# Patient Record
Sex: FEMALE | Race: ASIAN | NOT HISPANIC OR LATINO | Employment: FULL TIME | URBAN - METROPOLITAN AREA
[De-identification: names, ages, dates, MRNs, and addresses within clinical notes are randomized per-mention and may not be internally consistent; named-entity substitution may affect disease eponyms.]

---

## 2017-02-28 ENCOUNTER — ALLSCRIPTS OFFICE VISIT (OUTPATIENT)
Dept: OTHER | Facility: OTHER | Age: 42
End: 2017-02-28

## 2017-04-13 ENCOUNTER — GENERIC CONVERSION - ENCOUNTER (OUTPATIENT)
Dept: OTHER | Facility: OTHER | Age: 42
End: 2017-04-13

## 2017-04-14 ENCOUNTER — LAB CONVERSION - ENCOUNTER (OUTPATIENT)
Dept: OTHER | Facility: OTHER | Age: 42
End: 2017-04-14

## 2017-04-14 LAB
A/G RATIO (HISTORICAL): 1.6 (CALC) (ref 1–2.5)
ALBUMIN SERPL BCP-MCNC: 4.7 G/DL (ref 3.6–5.1)
ALP SERPL-CCNC: 36 U/L (ref 33–115)
ALT SERPL W P-5'-P-CCNC: 17 U/L (ref 6–29)
AST SERPL W P-5'-P-CCNC: 19 U/L (ref 10–30)
BACTERIA UR QL AUTO: ABNORMAL /HPF
BASOPHILS # BLD AUTO: 0.8 %
BASOPHILS # BLD AUTO: 62 CELLS/UL (ref 0–200)
BILIRUB SERPL-MCNC: 0.5 MG/DL (ref 0.2–1.2)
BILIRUB UR QL STRIP: NEGATIVE
BUN SERPL-MCNC: 11 MG/DL (ref 7–25)
BUN/CREA RATIO (HISTORICAL): NORMAL (CALC) (ref 6–22)
CALCIUM SERPL-MCNC: 9.4 MG/DL (ref 8.6–10.2)
CHLORIDE SERPL-SCNC: 100 MMOL/L (ref 98–110)
CHOLEST SERPL-MCNC: 198 MG/DL (ref 125–200)
CHOLEST/HDLC SERPL: 3 (CALC)
CO2 SERPL-SCNC: 21 MMOL/L (ref 20–31)
COLOR UR: YELLOW
COMMENT (HISTORICAL): CLEAR
CREAT SERPL-MCNC: 0.71 MG/DL (ref 0.5–1.1)
DEPRECATED RDW RBC AUTO: 13 % (ref 11–15)
EGFR AFRICAN AMERICAN (HISTORICAL): 123 ML/MIN/1.73M2
EGFR-AMERICAN CALC (HISTORICAL): 107 ML/MIN/1.73M2
EOSINOPHIL # BLD AUTO: 0.8 %
EOSINOPHIL # BLD AUTO: 62 CELLS/UL (ref 15–500)
FECAL OCCULT BLOOD DIAGNOSTIC (HISTORICAL): NEGATIVE
GAMMA GLOBULIN (HISTORICAL): 2.9 G/DL (CALC) (ref 1.9–3.7)
GLUCOSE (HISTORICAL): 79 MG/DL (ref 65–99)
GLUCOSE (HISTORICAL): NEGATIVE
HCT VFR BLD AUTO: 43.7 % (ref 35–45)
HDLC SERPL-MCNC: 67 MG/DL
HEPATITIS B SURFACE ANTIBODY (HISTORICAL): <5 MIU/ML
HGB BLD-MCNC: 14.6 G/DL (ref 11.7–15.5)
HYALINE CASTS #/AREA URNS LPF: ABNORMAL /LPF
KETONES UR STRIP-MCNC: ABNORMAL MG/DL
LDL CHOLESTEROL (HISTORICAL): 122 MG/DL (CALC)
LEUKOCYTE ESTERASE UR QL STRIP: ABNORMAL
LYMPHOCYTES # BLD AUTO: 1679 CELLS/UL (ref 850–3900)
LYMPHOCYTES # BLD AUTO: 21.8 %
MCH RBC QN AUTO: 29.8 PG (ref 27–33)
MCHC RBC AUTO-ENTMCNC: 33.5 G/DL (ref 32–36)
MCV RBC AUTO: 89 FL (ref 80–100)
MONOCYTES # BLD AUTO: 316 CELLS/UL (ref 200–950)
MONOCYTES (HISTORICAL): 4.1 %
MUMPS IGG ANTIBODY (HISTORICAL): 3.45 INDEX
NEUTROPHILS # BLD AUTO: 5583 CELLS/UL (ref 1500–7800)
NEUTROPHILS # BLD AUTO: 72.5 %
NITRITE UR QL STRIP: NEGATIVE
NON-HDL-CHOL (CHOL-HDL) (HISTORICAL): 131 MG/DL (CALC)
PH UR STRIP.AUTO: 7 [PH] (ref 5–8)
PLATELET # BLD AUTO: 324 THOUSAND/UL (ref 140–400)
PMV BLD AUTO: 7.9 FL (ref 7.5–12.5)
POTASSIUM SERPL-SCNC: 4.1 MMOL/L (ref 3.5–5.3)
PROT UR STRIP-MCNC: NEGATIVE MG/DL
RBC # BLD AUTO: 4.92 MILLION/UL (ref 3.8–5.1)
RBC (HISTORICAL): ABNORMAL /HPF
RUBELLA, IGG (HISTORICAL): 24.5 INDEX
RUBEOLA AB, IGG (HISTORICAL): 1.89 INDEX
SODIUM SERPL-SCNC: 135 MMOL/L (ref 135–146)
SP GR UR STRIP.AUTO: 1 (ref 1–1.03)
SQUAMOUS EPITHELIAL CELLS (HISTORICAL): ABNORMAL /HPF
TOTAL PROTEIN (HISTORICAL): 7.6 G/DL (ref 6.1–8.1)
TRIGL SERPL-MCNC: 45 MG/DL
TSH SERPL DL<=0.05 MIU/L-ACNC: 1.56 MIU/L
VARICELLA ZOSTER IGG (HISTORICAL): <135 INDEX
WBC # BLD AUTO: 7.7 THOUSAND/UL (ref 3.8–10.8)
WBC # BLD AUTO: ABNORMAL /HPF

## 2017-04-15 ENCOUNTER — ALLSCRIPTS OFFICE VISIT (OUTPATIENT)
Dept: OTHER | Facility: OTHER | Age: 42
End: 2017-04-15

## 2017-05-30 ENCOUNTER — GENERIC CONVERSION - ENCOUNTER (OUTPATIENT)
Dept: OTHER | Facility: OTHER | Age: 42
End: 2017-05-30

## 2017-06-29 ENCOUNTER — ALLSCRIPTS OFFICE VISIT (OUTPATIENT)
Dept: OTHER | Facility: OTHER | Age: 42
End: 2017-06-29

## 2017-07-05 ENCOUNTER — LAB CONVERSION - ENCOUNTER (OUTPATIENT)
Dept: OTHER | Facility: OTHER | Age: 42
End: 2017-07-05

## 2017-07-05 LAB
MITOGEN-NIL (HISTORICAL): 8.8 IU/ML
QAUNTIFERON NIL VALUE (HISTORICAL): 0.06 IU/ML
QFT TB AG MINUS NIL VALUE (HISTORICAL): 0.03 IU/ML
QUANTIFERON TB GOLD (HISTORICAL): NEGATIVE

## 2017-07-10 ENCOUNTER — ALLSCRIPTS OFFICE VISIT (OUTPATIENT)
Dept: OTHER | Facility: OTHER | Age: 42
End: 2017-07-10

## 2017-07-12 ENCOUNTER — GENERIC CONVERSION - ENCOUNTER (OUTPATIENT)
Dept: OTHER | Facility: OTHER | Age: 42
End: 2017-07-12

## 2017-07-19 ENCOUNTER — GENERIC CONVERSION - ENCOUNTER (OUTPATIENT)
Dept: OTHER | Facility: OTHER | Age: 42
End: 2017-07-19

## 2017-08-16 ENCOUNTER — GENERIC CONVERSION - ENCOUNTER (OUTPATIENT)
Dept: OTHER | Facility: OTHER | Age: 42
End: 2017-08-16

## 2017-08-30 ENCOUNTER — ALLSCRIPTS OFFICE VISIT (OUTPATIENT)
Dept: OTHER | Facility: OTHER | Age: 42
End: 2017-08-30

## 2017-09-22 ENCOUNTER — GENERIC CONVERSION - ENCOUNTER (OUTPATIENT)
Dept: OTHER | Facility: OTHER | Age: 42
End: 2017-09-22

## 2017-11-03 ENCOUNTER — GENERIC CONVERSION - ENCOUNTER (OUTPATIENT)
Dept: OTHER | Facility: OTHER | Age: 42
End: 2017-11-03

## 2017-12-11 ENCOUNTER — HOSPITAL ENCOUNTER (OUTPATIENT)
Dept: RADIOLOGY | Age: 42
Discharge: HOME | End: 2017-12-11
Payer: COMMERCIAL

## 2017-12-11 ENCOUNTER — ALLSCRIPTS OFFICE VISIT (OUTPATIENT)
Dept: OTHER | Facility: OTHER | Age: 42
End: 2017-12-11

## 2017-12-11 DIAGNOSIS — Z12.31 ENCOUNTER FOR SCREENING MAMMOGRAM FOR MALIGNANT NEOPLASM OF BREAST: ICD-10-CM

## 2017-12-11 DIAGNOSIS — N92.6 IRREGULAR MENSTRUATION: ICD-10-CM

## 2017-12-11 PROCEDURE — 77063 BREAST TOMOSYNTHESIS BI: CPT

## 2017-12-11 PROCEDURE — 76830 TRANSVAGINAL US NON-OB: CPT

## 2017-12-11 PROCEDURE — 76856 US EXAM PELVIC COMPLETE: CPT

## 2017-12-11 PROCEDURE — G0202 SCR MAMMO BI INCL CAD: HCPCS

## 2017-12-12 ENCOUNTER — GENERIC CONVERSION - ENCOUNTER (OUTPATIENT)
Dept: OTHER | Facility: OTHER | Age: 42
End: 2017-12-12

## 2017-12-12 NOTE — PROGRESS NOTES
Assessment    1  Visit for screening mammogram (V76 12) (Z12 31)   2  Irregular bleeding (626 4) (N92 6)    Plan  Irregular bleeding    · * US PELVIS COMPLETE (TRANSABDOMINAL AND TRANSVAGINAL); Status:Hold For -Scheduling; Requested for:90Xey2785;    Perform:[facility]; Due:86Ttr5358; Ordered;bleeding; Ordered By:Abdi Santa; Visit for screening mammogram    · MAMMO SCREENING BILATERAL W 3D & CAD; Status:Hold For - Scheduling; Requestedfor:24Xmr7265;    Perform:[facility]; Due:76Fjk1541; Ordered;for screening mammogram; Ordered By:Abdi Santa;    Chief Complaint  Chief Complaint Free Text Note Form: pt is here c/o bleeding daily on ParaGard  History of Present Illness  HPI: 38year old female here for evaluation  She was having heavy periods with her ParaGard and in June we started Depo; she received one injection and has had daily vaginal bleeding since that time  She did not come back for another injection for fear of continued abnormal bleeding  She has days where she has only some bleeding when she wipes, and other days she has bleeding heavy enough for a tampon  She has no pain  is concerned about endometrial polyps which her mother had  She is concerned about her history of HPV causing bleeding  discussed her normal TSH in April  We discussed her neg/neg Pap in October 2016  offered her the following:Pap with HPV  I am not sure that this would be covered by her insurance  Pelvic ultrasound  May or may not show polyps whether they are there or not  Endometrial biopsy for tissue diagnosis  this point she would like to check a pelvic ultrasound  We will discuss results once they are back  We discussed her bleeding as still probably due to her Depo, and she may have irregular bleeding for over a year after a Depo  Active Problems    1  Allergic rhinitis (477 9) (J30 9)   2  Encounter for gynecological examination without abnormal finding (V72 31) (Z01 419)   3   Encounter for screening mammogram for malignant neoplasm of breast (V76 12) (Z12 31)   4  Inversion, nipple (611 79) (N64 59)   5  Irregular bleeding (626 4) (N92 6)   6  Need for prophylactic vaccination against hepatitis B virus (V05 3) (Z23)   7  Presence of (intrauterine) contraceptive device (V45 51) (Z97 5)    Past Medical History  1  History of Age At First Period 17 Years Old (Menarche)   2  History of ASCUS with positive high risk HPV cervical (795 01,795 05) (R87 610,R87 810)   3  History Of 2  Previous Pregnancies (V61 5)   4  History of gastritis (V12 79) (Z87 19)   5  History of herpes labialis (V12 09) (Z86 19)   6  History of herpes simplex infection (V12 09) (Z86 19)   7  Normal delivery (650) (O80,Z37 9)   8  Normal delivery 21   9)    Surgical History  1  History of Biopsy Endometrial, Without Cervical Dilation   2  History of Colposcopy Cervix With Biopsy(S) With Endocervical Curettage   3  History of Incision And Drainage Of Pilonidal Cyst, Simple    Family History  Mother    1  Family history of Family Health Status Of Mother - Good  Father    2  Family history of coronary artery disease (V17 3) (Z82 49)   3  Family history of Hypertension (V17 49)  Maternal Grandmother    4  Family history of Diabetes Mellitus (V18 0)   5  Family history of Heart Disease (V17 49)  Maternal Grandfather    6  Family history of Chronic Liver Disease  Family History    7  Family history of Hepatitis   8  Family history of Peptic Ulcer   9  Family history of Urinary Incontinence   10  Family history of Varicose Veins Of Lower Extremities    Social History     · Denied: History of Alcohol Use (History)   · Currently sexually active   · Daily Coffee Consumption (6  Cups/Day)   · Drug Use   ·    · Never a smoker   · Presence of (intrauterine) contraceptive device (V45 51) (Z97 5)   · Uses Safety Equipment - Seatbelts    Current Meds   1  Claritin 10 MG Oral Capsule; Therapy: (Recorded:01May2015) to Recorded   2   ValACYclovir HCl - 1 GM Oral Tablet; TAKE 2 TABLETS AT FIRST SIGN OF ERUPTION,THEN 2 TABLETS EVERY 12 HOURS for a total of 4 doses; Therapy: 21TAU2001 to [phone])  Requested for: 10FJU0861; Last Rx:52Aer8080 Ordered   3  Womens One Daily TABS; Take 1 tablet daily; Therapy: (Recorded:32Mft2060) to Recorded    Allergies  1  Penicillins   2  Unasyn SOLR  3  Seasonal    Vitals  Vital Signs    Recorded: 58JJT9989 30:94AS   Systolic 120, LUE, Sitting   Diastolic 78, LUE, Sitting   Weight 126 lb    BMI Calculated 23 81   BSA Calculated 1 55   LMP Abn bleeding       Health Management  History of Encounter for routine gynecological examination   (1) THIN PREP PAP WITH IMAGING; every 1 year; Last 45ZSI1992; Next Due: 43PMD4771;  Overdue    Signatures   Electronically signed by : Jennifer Tilley, [facility]; Dec 11 2017 10:24AM EST                       (Author)    Electronically signed by : Kymberly Robledo MD; Dec 11 2017  1:07PM EST

## 2018-01-11 NOTE — PROGRESS NOTES
Chief Complaint  Patient is here today to get her depo injection  Given in right buttock area with no difficulty  depo provera 150 mg  2020   Active Problems    1  Allergic rhinitis (477 9) (J30 9)   2  ASCUS with positive high risk HPV cervical (795 01,795 05) (R87 610,R87 810)   3  Encounter for gynecological examination without abnormal finding (V72 31) (Z01 419)   4  Encounter for screening mammogram for malignant neoplasm of breast (V76 12)   (Z12 31)   5  Inversion, nipple (611 79) (N64 59)   6  Menorrhagia (626 2) (N92 0)   7  Need for hepatitis B vaccination (V05 3) (Z23)   8  Presence of (intrauterine) contraceptive device (V45 51) (Z97 5)    Current Meds   1  Claritin 10 MG Oral Capsule; Therapy: (Recorded:39Sgf6778) to Recorded   2  MedroxyPROGESTERone Acetate 150 MG/ML Intramuscular Suspension; INJECT 1 ML   INTRAMUSCULARLY ONCE EVERY 3 MONTHS; Therapy: 13SIP8610 to (Last Teryl Thomas)  Requested for: 38GWG1135 Ordered   3  Womens One Daily TABS; Take 1 tablet daily; Therapy: (Recorded:64Orx3595) to Recorded    Allergies    1  Penicillins   2  Unasyn SOLR    3  Seasonal    Vitals  Signs    Systolic: 396, LUE, Sitting  Diastolic: 66, LUE, Sitting  Height: 5 ft 1 in  Weight: 124 lb   BMI Calculated: 23 43  BSA Calculated: 1 54  LMP: mirena / depo    Plan   MedroxyPROGESTERone Acetate 150 MG/ML Intramuscular Suspension; INJECT INTRAMUSCULARLY EVERY 12 WEEKS AS DIRECTED; Done: 30MVE3066 01:28PM; Status: COMPLETE - Retrospective By Protocol Authorization Ordered  Rx By: Sonja Risk; For: Menorrhagia, Presence of (intrauterine) contraceptive device; Dose of 150 MG;  Intramuscular; ELENA = N; Administered byAbner Wesley MA: 7/10/2017 1:28:00 PM; Last Updated By: Tosha Leiva; 7/10/2017 2:04:40 PM     Future Appointments    Date/Time Provider Specialty Site   10/30/2017 08:00 AM Uzair, Nurse Schedule  [address]   10/02/2017 01:00 PM CHRISTOPHER CURRY, Nurse Schedule  [facility]     Signatures   Electronically signed by : Kalyan Smith, [address]; Jul 10 2017  6:17PM EST                       (Author)

## 2018-01-12 ENCOUNTER — ALLSCRIPTS OFFICE VISIT (OUTPATIENT)
Dept: OTHER | Facility: OTHER | Age: 43
End: 2018-01-12

## 2018-01-12 DIAGNOSIS — R51.9 HEADACHE: ICD-10-CM

## 2018-01-12 DIAGNOSIS — R32 URINARY INCONTINENCE: ICD-10-CM

## 2018-01-12 NOTE — PROGRESS NOTES
Assessment    1  Encounter for preventive health examination (V70 0) (Z00 00)    Plan  Health Maintenance    · Follow-up visit in 1 year Evaluation and Treatment  Follow-up  Status: Hold For -  Scheduling  Requested for: 15Apr2017    Discussion/Summary  health maintenance visit Currently, she eats an adequate diet and has an adequate exercise regimen  cervical cancer screening is current cervical cancer screening is managed by Jazzy Mixon Breast cancer screening: monthly self breast exam was advised and mammogram has been ordered  Colorectal cancer screening: colorectal cancer screening is not indicated  Advice and education were given regarding nutrition, weight bearing exercise, vitamin D supplements, sunscreen use and self skin examination  Patient discussion: discussed with the patient  The treatment plan was reviewed with the patient/guardian  The patient/guardian understands and agrees with the treatment plan      Chief Complaint  Pt presents in the office today for a PE;    Mammo due; order was given last visit  Pap due 10/20/2018         History of Present Illness  HM, Adult Female: The patient is being seen for a health maintenance evaluation  General Health: The patient's health since the last visit is described as good  She has regular dental visits  She denies vision problems  She denies hearing loss  Immunizations status: up to date  Lifestyle:  She consumes a diverse and healthy diet  She exercises regularly  She exercises less than three times a week for 30 or more minutes per session  She does not use tobacco  She consumes alcohol  She reports occasional social alcohol use  She denies drug use  Reproductive health: the patient is premenopausal   she reports normal menses  Screening:      Review of Systems    Constitutional: No fever, no chills, feels well, no tiredness, no recent weight gain or weight loss     Eyes: No complaints of eye pain, no red eyes, no eyesight problems, no discharge, no dry eyes, no itching of eyes  ENT: no earache, no sore throat, no nasal discharge and no hoarseness  Cardiovascular: No complaints of slow heart rate, no fast heart rate, no chest pain, no palpitations, no leg claudication, no lower extremity edema  Respiratory: No complaints of shortness of breath, no wheezing, no cough, no SOB on exertion, no orthopnea, no PND  Gastrointestinal: No complaints of abdominal pain, no constipation, no nausea or vomiting, no diarrhea, no bloody stools  Genitourinary: no dysuria, no pelvic pain, no vaginal discharge, no dysmenorrhea and no unexplained vaginal bleeding  Musculoskeletal: No complaints of arthralgias, no myalgias, no joint swelling or stiffness, no limb pain or swelling  Integumentary: no rashes, no breast pain and no breast lump  Neurological: no headache, no numbness, no tingling, no limb weakness, no fainting and no difficulty walking  Psychiatric: no anxiety, no sleep disturbances, no depression and no emotional problems  Hematologic/Lymphatic: no swollen glands  Active Problems    1  Allergic rhinitis (477 9) (J30 9)   2  ASCUS favor benign (796 9)   3  ASCUS with positive high risk HPV cervical (795 01,795 05) (R87 610,R87 810)   4  Encounter for gynecological examination without abnormal finding (V72 31) (Z01 419)   5  Encounter for screening mammogram for malignant neoplasm of breast (V76 12)   (Z12 31)   6  HPV (human papilloma virus) infection (079 4) (A63 0)   7  Immunity status testing (V72 61) (Z01 84)   8  Inversion, nipple (611 79) (N64 59)   9  Postcoital bleeding (626 7) (N93 0)   10  Presence of (intrauterine) contraceptive device (V45 51) (Z97 5)   11  Screening for cholesterol level (V77 91) (Z13 220)   12  Screening for human papillomavirus (HPV) (V73 81) (Z11 51)   13   Screening for tuberculosis (V74 1) (Z11 1)    Past Medical History    · Acute upper respiratory infection (465 9) (J06 9)   · History of Age At First Period 17 Years Old (Menarche)   · History of Encounter for routine gynecological examination (V72 31) (Z01 419)   · History Of 2  Previous Pregnancies (V61 5)   · History of abdominal pain (V13 89) (J79 805)   · History of gastritis (V12 79) (Z87 19)   · History of herpes simplex infection (V12 09) (Z86 19)   · History of pharyngitis (V12 69) (Z87 09)   · History of upper respiratory infection (V12 09) (Z87 09)   · History of vaginitis (V13 29) (Z87 42)   · History of Hordeolum externum, unspecified laterality (373 11) (H00 019)   · Normal delivery (650) (O80,Z37  9)   · Normal delivery (650) (O80,Z37  9)   · History of Puffy Eyelids (374 82)    Surgical History    · History of Biopsy Endometrial, Without Cervical Dilation   · History of Colposcopy Cervix With Biopsy(S) With Endocervical Curettage   · History of Incision And Drainage Of Pilonidal Cyst, Simple    Family History  Mother    · Family history of Family Health Status Of Mother - Good  Father    · Family history of coronary artery disease (V17 3) (Z82 49)   · Family history of Hypertension (V17 49)  Maternal Grandmother    · Family history of Diabetes Mellitus (V18 0)   · Family history of Heart Disease (V17 49)  Maternal Grandfather    · Family history of Chronic Liver Disease  Family History    · Family history of Hepatitis   · Family history of Peptic Ulcer   · Family history of Urinary Incontinence   · Family history of Varicose Veins Of Lower Extremities    Social History    · Denied: History of Alcohol Use (History)   · Currently sexually active   · Daily Coffee Consumption (6  Cups/Day)   · Drug Use   · Pastusage   ·    · Never a smoker   · Presence of (intrauterine) contraceptive device (V45 51) (Z97 5)   · Uses Safety Equipment - Seatbelts    Current Meds   1  Claritin 10 MG Oral Capsule; Therapy: (Recorded:84Kia7720) to Recorded   2  Womens One Daily TABS; Take 1 tablet daily;    Therapy: (800 0336) to Recorded    Allergies    1  Penicillins   2  Unasyn SOLR    3  Seasonal    Vitals   Recorded: 15Apr2017 09:14AM   Heart Rate 60   Respiration 16   Systolic 576   Diastolic 80   Height 5 ft 1 75 in   Weight 121 lb    BMI Calculated 22 31   BSA Calculated 1 54     Physical Exam    Constitutional   General appearance: No acute distress, well appearing and well nourished  Eyes   Conjunctiva and lids: No swelling, erythema or discharge  PERRL, EOMI  Ears, Nose, Mouth, and Throat   External inspection of ears and nose: Normal     Otoscopic examination: Tympanic membranes translucent with normal light reflex  Canals patent without erythema  Lips, teeth, and gums: Normal, good dentition  Oropharynx: Normal with no erythema, edema, exudate or lesions  Neck   Neck: Supple, symmetric, trachea midline, no masses  Thyroid: Normal, no thyromegaly  Pulmonary   Respiratory effort: No increased work of breathing or signs of respiratory distress  Auscultation of lungs: Clear to auscultation  Cardiovascular   Auscultation of heart: Normal rate and rhythm, normal S1 and S2, no murmurs  Examination of extremities for edema and/or varicosities: Normal     Abdomen   Abdomen: Non-tender, no masses  Liver and spleen: No hepatomegaly or splenomegaly  Lymphatic   Palpation of lymph nodes in neck: No lymphadenopathy  Musculoskeletal   Muscle strength/tone: Normal     Neurologic   Cranial nerves: Cranial nerves II-XII intact  Reflexes: 2+ and symmetric  Psychiatric   Orientation to person, place, and time: Normal     Recent and remote memory: Intact      Mood and affect: Normal        Results/Data  (1) CBC/PLT/DIFF 13Apr2017 12:30PM Justine Lion     Test Name Result Flag Reference   WHITE BLOOD CELL COUNT 7 7 Thousand/uL  3 8-10 8   RED BLOOD CELL COUNT 4 92 Million/uL  3 80-5 10   HEMOGLOBIN 14 6 g/dL  11 7-15 5   HEMATOCRIT 43 7 %  35 0-45 0   MCV 89 0 fL  80 0-100 0   MCH 29 8 pg  27 0-33 0 MCHC 33 5 g/dL  32 0-36 0   RDW 13 0 %  11 0-15 0   PLATELET COUNT 794 Thousand/uL  140-400   MPV 7 9 fL  7 5-12 5   ABSOLUTE NEUTROPHILS 5583 cells/uL  5964-4587   ABSOLUTE LYMPHOCYTES 1679 cells/uL  850-3900   ABSOLUTE MONOCYTES 316 cells/uL  200-950   ABSOLUTE EOSINOPHILS 62 cells/uL     ABSOLUTE BASOPHILS 62 cells/uL  0-200   NEUTROPHILS 72 5 %     LYMPHOCYTES 21 8 %     MONOCYTES 4 1 %     EOSINOPHILS 0 8 %     BASOPHILS 0 8 %       (1) COMPREHENSIVE METABOLIC PANEL 94AHN6068 15:85LC Gabriela Winslow     Test Name Result Flag Reference   GLUCOSE 79 mg/dL  65-99   Fasting reference interval   UREA NITROGEN (BUN) 11 mg/dL  7-25   CREATININE 0 71 mg/dL  0 50-1 10   eGFR NON-AFR   AMERICAN 107 mL/min/1 73m2  > OR = 60   eGFR AFRICAN AMERICAN 123 mL/min/1 73m2  > OR = 60   BUN/CREATININE RATIO   3-36   NOT APPLICABLE (calc)   SODIUM 135 mmol/L  135-146   POTASSIUM 4 1 mmol/L  3 5-5 3   CHLORIDE 100 mmol/L     CARBON DIOXIDE 21 mmol/L  20-31   CALCIUM 9 4 mg/dL  8 6-10 2   PROTEIN, TOTAL 7 6 g/dL  6 1-8 1   ALBUMIN 4 7 g/dL  3 6-5 1   GLOBULIN 2 9 g/dL (calc)  1 9-3 7   ALBUMIN/GLOBULIN RATIO 1 6 (calc)  1 0-2 5   BILIRUBIN, TOTAL 0 5 mg/dL  0 2-1 2   ALKALINE PHOSPHATASE 36 U/L     AST 19 U/L  10-30   ALT 17 U/L  6-29     (1) URINALYSIS (will reflex a microscopy if leukocytes, occult blood, protein or nitrites are not within normal limits) 13Apr2017 12:30PM Gabriela Winslow     Test Name Result Flag Reference   COLOR YELLOW  YELLOW   APPEARANCE CLEAR  CLEAR   SPECIFIC GRAVITY 1 004  1 001-1 035   PH 7 0  5 0-8 0   GLUCOSE NEGATIVE  NEGATIVE   BILIRUBIN NEGATIVE  NEGATIVE   KETONES TRACE A NEGATIVE   OCCULT BLOOD NEGATIVE  NEGATIVE   PROTEIN NEGATIVE  NEGATIVE   NITRITE NEGATIVE  NEGATIVE   LEUKOCYTE ESTERASE TRACE A NEGATIVE   WBC NONE SEEN /HPF  < OR = 5   RBC NONE SEEN /HPF  < OR = 2   SQUAMOUS EPITHELIAL CELLS NONE SEEN /HPF  < OR = 5   BACTERIA NONE SEEN /HPF  NONE SEEN   HYALINE CAST NONE SEEN /LPF  NONE SEEN     (Q) LIPID PANEL WITH REFLEX TO DIRECT LDL 13Apr2017 12:30PM Pushpa Zee     Test Name Result Flag Reference   CHOLESTEROL, TOTAL 198 mg/dL  125-200   HDL CHOLESTEROL 67 mg/dL  > OR = 46   TRIGLICERIDES 45 mg/dL  <593   LDL-CHOLESTEROL 122 mg/dL (calc)  <130   Desirable range <100 mg/dL for patients with CHD or  diabetes and <70 mg/dL for diabetic patients with  known heart disease  CHOL/HDLC RATIO 3 0 (calc)  < OR = 5 0   NON HDL CHOLESTEROL 131 mg/dL (calc)     Target for non-HDL cholesterol is 30 mg/dL higher than   LDL cholesterol target  (Q) TSH, 3RD GENERATION W/REFLEX TO FT4 13Apr2017 12:30PM Pushpa Zee   REPORT COMMENT:  FASTING:YES     Test Name Result Flag Reference   TSH W/REFLEX TO FT4 1 56 mIU/L     Reference Range                         > or = 20 Years  0 40-4 50                              Pregnancy Ranges            First trimester    0 26-2 66            Second trimester   0 55-2 73            Third trimester    0 43-2 91       Health Management  History of Encounter for routine gynecological examination   (1) THIN PREP PAP WITH IMAGING; every 1 year; Last [id number]; Next Due:  50RKY6668;  Overdue    Signatures   Electronically signed by : MILLIE Murphy ; Apr 15 2017 11:01AM EST                       (Author)

## 2018-01-13 VITALS
RESPIRATION RATE: 16 BRPM | HEART RATE: 60 BPM | SYSTOLIC BLOOD PRESSURE: 110 MMHG | DIASTOLIC BLOOD PRESSURE: 80 MMHG | BODY MASS INDEX: 22.26 KG/M2 | WEIGHT: 121 LBS | HEIGHT: 62 IN

## 2018-01-13 VITALS
DIASTOLIC BLOOD PRESSURE: 64 MMHG | SYSTOLIC BLOOD PRESSURE: 100 MMHG | HEIGHT: 61 IN | BODY MASS INDEX: 23.22 KG/M2 | WEIGHT: 123 LBS

## 2018-01-13 VITALS — BODY MASS INDEX: 23.05 KG/M2 | WEIGHT: 122 LBS | SYSTOLIC BLOOD PRESSURE: 100 MMHG | DIASTOLIC BLOOD PRESSURE: 62 MMHG

## 2018-01-13 NOTE — MISCELLANEOUS
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  is it ok to schedule pt for a varicella and a dtap  states she had a titer done for varicella but it showed that she needs to get a shot  i am not sure that is right but that is what pt states  pt # [phone]   Nirukaty Bolivar - 10 Jul 2017 9:48 PM     TASK REPLIED TO: Previously Assigned To Niru Bolivar  she would need to schedule an appointment so we can review the labs she has and give the appropriate shots  If she has a document that says she needs two shots from her work that can be a nursing visit but if they are labs that need to be reviewed and interpreted that should probably be a visit  I can look Wednesday if you want  Lynette Terrazas - 11 Jul 2017 2:36 PM     TASK EDITED  tried to reach pt but her mailbox has not been set up   Lynette Terrazas - 12 Jul 2017 10:05 AM     TASK EDITED  please see the info in red folder on this issue  Niru Bolivar - 12 Jul 2017 12:47 PM     TASK REPLIED TO: Previously Assigned To Niru Katt  see me when you get a chance  Lynette Terrazas - 12 Jul 2017 3:16 PM     TASK EDITED  message was left for pt to call and schedule her varicella shot          Signatures   Electronically signed by : Ariel Bradford, [facility]; Jul 12 2017  4:16PM EST                       (Author)

## 2018-01-13 NOTE — PROGRESS NOTES
Assessment   1  Urinary incontinence (788 30) (R32)   2  Headache (784 0) (R51)    Plan   Headache    · * MRI BRAIN W WO CONTRAST; Status:Need Information - Financial Authorization; Requested ZRO:[id number];   Urinary incontinence    · *1 - [address] or Angelina Martinez for incontinence  Status: Active  Requested for: [id number]  Care Summary provided  : Yes   · 1 Ade Jimenez MD, James Torers (Urology) Co-Management  *  Status: Canceled  Care Summary provided  : Yes    Discussion/Summary      1  headache - most likely due to stress, encouraged starting exercise, sleeping better, healthy eating  will order MRI due to other symptoms that have started when headaches did but most likely are also due to stress  incontinence - see either Christa Gusman or Berny Cherry for pelvic floor PT   - SLOGA due 12/2018   as needed  Possible side effects of new medications were reviewed with the patient/guardian today  The treatment plan was reviewed with the patient/guardian  The patient/guardian understands and agrees with the treatment plan      Chief Complaint   Pt presents here for a headache   due 12/11/2018 due 10/2018      History of Present Illness   HPI: Patient with a headache starting in back of neck and moves to left side of face  Left eye twitching for a couple days  Has been having headaches for months  Taking Claritin today, takes Advil as needed but not daily  Trying to stay hydrated  Has been having headaches for forever  Has lost hearing in R ear, progressive per patient  Twitching under L eye off and on  Even notices sometimes when she talks she will drool a little  All very random and sporadic things but worry patient  PRessure behind eyes  Even noticed increase in incontinence even when laying flat at night  incontinence has gotten worse, requesting a urology referral  Denies burning, hematuria  Has gained weight and has not been working out  very stressful        Active Problems   1  Allergic rhinitis (477 9) (J30 9)   2  Encounter for gynecological examination without abnormal finding (V72 31) (Z01 419)   3  Encounter for screening mammogram for malignant neoplasm of breast (V76 12)     (Z12 31)   4  Inversion, nipple (611 79) (N64 59)   5  Irregular bleeding (626 4) (N92 6)   6  Need for prophylactic vaccination against hepatitis B virus (V05 3) (Z23)   7  Presence of (intrauterine) contraceptive device (V45 51) (Z97 5)   8  Visit for screening mammogram (V76 12) (Z12 31)    Past Medical History   1  History of Age At First Period 17 Years Old (Menarche)   2  History of ASCUS with positive high risk HPV cervical (795 01,795 05)     (R87 610,R87 810)   3  History Of 2  Previous Pregnancies (V61 5)   4  History of gastritis (V12 79) (Z87 19)   5  History of herpes labialis (V12 09) (Z86 19)   6  History of herpes simplex infection (V12 09) (Z86 19)   7  Normal delivery (650) (O80,Z37 9)   8  Normal delivery 21   9)  Active Problems And Past Medical History Reviewed: The active problems and past medical history were reviewed and updated today  Family History   Mother    1  Family history of Family Health Status Of Mother - Good  Father    2  Family history of coronary artery disease (V17 3) (Z82 49)   3  Family history of Hypertension (V17 49)  Maternal Grandmother    4  Family history of Diabetes Mellitus (V18 0)   5  Family history of Heart Disease (V17 49)  Maternal Grandfather    6  Family history of Chronic Liver Disease  Family History    7  Family history of Hepatitis   8  Family history of Peptic Ulcer   9  Family history of Urinary Incontinence   10  Family history of Varicose Veins Of Lower Extremities  Family History Reviewed: The family history was reviewed and updated today         Social History    · Denied: History of Alcohol Use (History)   · Currently sexually active   · Daily Coffee Consumption (6  Cups/Day)   · Drug Use   · Pastusage   ·    · Never a smoker   · Presence of (intrauterine) contraceptive device (V45 51) (Z97 5)   · Uses Safety Equipment - Seatbelts  The social history was reviewed and updated today  The social history was reviewed and is unchanged  Surgical History   1  History of Biopsy Endometrial, Without Cervical Dilation   2  History of Colposcopy Cervix With Biopsy(S) With Endocervical Curettage   3  History of Incision And Drainage Of Pilonidal Cyst, Simple  Surgical History Reviewed: The surgical history was reviewed and updated today  Current Meds    1  Claritin 10 MG Oral Capsule; take 1 capsule daily; Therapy: (Recorded:12Jan2018) to Recorded   2  ValACYclovir HCl - 1 GM Oral Tablet; TAKE 2 TABLETS AT FIRST SIGN OF     ERUPTION,THEN 2 TABLETS EVERY 12 HOURS for a total of 4 doses; Therapy: 60HWY6985 to [phone]  Requested for: 97CVX7733; Last     Rx:10Amv6114 Ordered   3  Womens One Daily TABS; Take 1 tablet daily; Therapy: (Recorded:91Jcy3255) to Recorded     The medication list was reviewed and updated today  Allergies   1  Penicillins   2  Unasyn SOLR  3  Seasonal    Vitals    Recorded: 73CJE0418 01:35PM   Heart Rate 68   Respiration 16   Systolic 566, LUE, Sitting   Diastolic 68, LUE, Sitting   Height 5 ft 1 25 in   Weight 124 lb 9 6 oz   BMI Calculated 23 35   BSA Calculated 1 55     Physical Exam        Constitutional      General appearance: No acute distress, well appearing and well nourished  Eyes      Conjunctiva and lids: No swelling, erythema or discharge  Pupils and irises: Equal, round and reactive to light  Ears, Nose, Mouth, and Throat      External inspection of ears and nose: Normal        Otoscopic examination: Tympanic membranes translucent with normal light reflex  Canals patent without erythema  Nasal mucosa, septum, and turbinates: Normal without edema or erythema         Oropharynx: Normal with no erythema, edema, exudate or lesions  Pulmonary      Respiratory effort: No increased work of breathing or signs of respiratory distress  Auscultation of lungs: Clear to auscultation  Cardiovascular      Palpation of heart: Normal PMI, no thrills  Auscultation of heart: Normal rate and rhythm, normal S1 and S2, without murmurs  Lymphatic      Palpation of lymph nodes in neck: No lymphadenopathy  Musculoskeletal      Gait and station: Normal        Neurologic      Cranial nerves: Cranial nerves 2-12 intact  Reflexes: 2+ and symmetric  Sensation: No sensory loss         Psychiatric      Orientation to person, place, and time: Normal        Mood and affect: Normal           Signatures    Electronically signed by : SHARAN Bojorquez; Jan 12 2018  2:07PM EST                       (Author)     Electronically signed by : Patsy Boeck, M D ; Jan 12 2018  2:26PM EST                       (Author)

## 2018-01-14 VITALS
BODY MASS INDEX: 23.41 KG/M2 | WEIGHT: 124 LBS | SYSTOLIC BLOOD PRESSURE: 114 MMHG | DIASTOLIC BLOOD PRESSURE: 66 MMHG | HEIGHT: 61 IN

## 2018-01-14 VITALS
HEART RATE: 62 BPM | DIASTOLIC BLOOD PRESSURE: 72 MMHG | SYSTOLIC BLOOD PRESSURE: 102 MMHG | WEIGHT: 118.6 LBS | BODY MASS INDEX: 22.39 KG/M2 | HEIGHT: 61 IN | RESPIRATION RATE: 16 BRPM | TEMPERATURE: 98.2 F

## 2018-01-14 NOTE — MISCELLANEOUS
Message   Recorded as Task   Date: 09/15/2017 08:31 AM, Created By: Helen Mcclure   Task Name: Med Renewal Request   Assigned To: Efren Emanuel   Regarding Patient: Vasile Bates, Status: Active   Comment:    Nava Lowry - 15 Sep 2017 8:31 AM     TASK CREATED  PT CALLED FOR A REFILL ON HER VALTREX FOR HER LIP SORE, SHE STATES THAT SHE GOT THIS FROM [facility]  [address]  LeonVamshikelsi - 15 Sep 2017 9:10 AM     TASK REASSIGNED: Previously Assigned To Adali Harris - 15 Sep 2017 9:34 AM     TASK IN PROGRESS   Susan Harper - 15 Sep 2017 9:43 AM     TASK EDITED   for pt tcb re Valtrex request  nothing in the chart   Andra Stringer - 18 Sep 2017 9:11 AM     TASK EDITED   Andra Stringer - 18 Sep 2017 9:11 AM     TASK IN PROGRESS   Andra Stringer - 18 Sep 2017 9:17 AM     TASK EDITED  [facility] for pt to cb       ext: Fredy Galeana - 21 Sep 2017 3:21 PM     TASK EDITED  Dear Rowdy García:    Pt called office today, left message  Pt has history of herpes simplex infection  Pt saw you on 8/30/17 for irregular bleeding  Pt states she felt a lesion beginning to form on her lip, however, was able to stop progression with OTC ABREVA medication  Pt currently has no active breakouts  Pt 's previous breakouts have been limited to her facial area only  Pt has not had any previous  outbreaks in the vaginal area  Pt states she was previously prescribed Valtrex by a provider located in [place], [place]  Pt is requesting another refill of Valtrex medication  Please advise  Thank you and have a great day! Kennedy Boo MA [facility] office)  Cristina Santa - 21 Sep 2017 3:38 PM     TASK REPLIED TO: Previously Assigned To Cristina Santa  does she want this for suppression (500mg po daily) or episodic treatment (500mg po BID x 3 days)? Either is fine  Thanks! Carmen Ladd - 21 Sep 2017 4:08 PM     TASK EDITED  Dear Rowdy García:    I just got off the phone with Pt    Pt is requesting the episodic treatment (Valtrex - 500mg PO BID x 3 days)  Pt further requests that prescription be forwarded to pharmacy [facility] E[facility] [place] [place]  Phone # [phone])  If you have any further questions/concerns, do not hesitate to contact me  Please let me know if you want me to enter Pt's prescription in [employer] for you  Thank you for all of your help! Have a good day! JEFFREY England Wendy - 22 Sep 2017 7:14 AM     TASK REPLIED TO: Previously Assigned To Cristina Santa  I sent rx  I was wrong on dose - for cold sores it is 2g po at onset of symptoms, repeat in 12 hours x 4 doses total   Sent to [facility]  Please make this a nurses note  Thx        Active Problems    1  Allergic rhinitis (477 9) (J30 9)   2  ASCUS with positive high risk HPV cervical (795 01,795 05) (R87 610,R87 810)   3  Cold sore (054 9) (B00 1)   4  Encounter for gynecological examination without abnormal finding (V72 31) (Z01 419)   5  Encounter for screening mammogram for malignant neoplasm of breast (V76 12)   (Z12 31)   6  Inversion, nipple (611 79) (N64 59)   7  Irregular bleeding (626 4) (N92 6)   8  Presence of (intrauterine) contraceptive device (V45 51) (Z97 5)    Current Meds   1  Claritin 10 MG Oral Capsule; Therapy: (Recorded:24Pfr7481) to Recorded   2  MedroxyPROGESTERone Acetate 150 MG/ML Intramuscular Suspension   (Depo-Provera); INJECT 1 ML INTRAMUSCULARLY ONCE EVERY 3   MONTHS; Therapy: 90NDM2113 to (Last [facility])  Requested for: 93MXM1041 Ordered   3  ValACYclovir HCl - 1 GM Oral Tablet (Valtrex); TAKE 2 TABLETS AT FIRST SIGN OF   ERUPTION,THEN 2 TABLETS EVERY 12 HOURS for a total of 4 doses; Therapy: 12VGF2884 to (797 9302)  Requested for: 50WKU2717; Last   Rx:18Asc1410 Ordered   4  Womens One Daily TABS; Take 1 tablet daily; Therapy: (Recorded:42Pmz7820) to Recorded    Allergies    1  Penicillins   2   Unasyn SOLR    3  Seasonal    Signatures   Electronically signed by : Macie Robertson, ; Sep 22 2017  7:29AM EST                       (Author)

## 2018-01-16 NOTE — MISCELLANEOUS
Message  MA spoke with LILIAN Santa today  LILIAN Santa informed MA that she made adjustment with regards to Pt's prescription for Valtrex  New prescription sent to pharmacy in Pt's EHR - verified transmission to retail pharmacy on 9/22/17 at 7:14 am  New prescription ordered for Pt is as follows: Valtrex 1 GM oral tablet, take 2 tablets at first sign of eruption, then 2 tablets every 12 hrs , for a total of 4 doses, 2 days #8 tablets, Refill# 6  Active Problems    1  Allergic rhinitis (477 9) (J30 9)   2  ASCUS with positive high risk HPV cervical (795 01,795 05) (R87 610,R87 810)   3  Cold sore (054 9) (B00 1)   4  Encounter for gynecological examination without abnormal finding (V72 31) (Z01 419)   5  Encounter for screening mammogram for malignant neoplasm of breast (V76 12)   (Z12 31)   6  Inversion, nipple (611 79) (N64 59)   7  Irregular bleeding (626 4) (N92 6)   8  Presence of (intrauterine) contraceptive device (V45 51) (Z97 5)    Current Meds   1  Claritin 10 MG Oral Capsule; Therapy: (Recorded:42Lsh7788) to Recorded   2  MedroxyPROGESTERone Acetate 150 MG/ML Intramuscular Suspension   (Depo-Provera); INJECT 1 ML INTRAMUSCULARLY ONCE EVERY 3   MONTHS; Therapy: 64XOX5289 to (Last Lonne Lion)  Requested for: 88YMQ1200 Ordered   3  ValACYclovir HCl - 1 GM Oral Tablet (Valtrex); TAKE 2 TABLETS AT FIRST SIGN OF   ERUPTION,THEN 2 TABLETS EVERY 12 HOURS for a total of 4 doses; Therapy: 63YIK9287 to ()  Requested for: 65MLQ1438; Last   Rx:96Wyh5249 Ordered   4  Womens One Daily TABS; Take 1 tablet daily; Therapy: (Recorded:51Jft3507) to Recorded    Allergies    1  Penicillins   2   Unasyn SOLR    3  Seasonal    Signatures   Electronically signed by : Jonah Mendoza MA; Sep 22 2017  8:40AM EST                       (Author)

## 2018-01-19 ENCOUNTER — HOSPITAL ENCOUNTER (OUTPATIENT)
Dept: MRI IMAGING | Facility: HOSPITAL | Age: 43
Discharge: HOME | End: 2018-01-19
Payer: COMMERCIAL

## 2018-01-19 DIAGNOSIS — R51.9 HEADACHE: ICD-10-CM

## 2018-01-19 PROCEDURE — A9585 GADOBUTROL INJECTION: HCPCS | Performed by: PHYSICIAN ASSISTANT

## 2018-01-19 PROCEDURE — 70553 MRI BRAIN STEM W/O & W/DYE: CPT

## 2018-01-19 RX ADMIN — GADOBUTROL 5 ML: 604.72 INJECTION INTRAVENOUS at 20:31

## 2018-01-22 VITALS — BODY MASS INDEX: 23.07 KG/M2 | HEIGHT: 61 IN | WEIGHT: 122.2 LBS

## 2018-01-22 VITALS — HEIGHT: 61 IN | BODY MASS INDEX: 23.45 KG/M2 | WEIGHT: 124.2 LBS

## 2018-01-22 VITALS
HEART RATE: 68 BPM | DIASTOLIC BLOOD PRESSURE: 68 MMHG | SYSTOLIC BLOOD PRESSURE: 108 MMHG | HEIGHT: 61 IN | RESPIRATION RATE: 16 BRPM | WEIGHT: 124.6 LBS | BODY MASS INDEX: 23.53 KG/M2

## 2018-01-22 VITALS — WEIGHT: 126 LBS | BODY MASS INDEX: 23.19 KG/M2 | HEIGHT: 62 IN

## 2018-01-22 VITALS — HEIGHT: 61 IN | WEIGHT: 124 LBS | BODY MASS INDEX: 23.41 KG/M2

## 2018-01-23 VITALS — DIASTOLIC BLOOD PRESSURE: 78 MMHG | BODY MASS INDEX: 23.81 KG/M2 | WEIGHT: 126 LBS | SYSTOLIC BLOOD PRESSURE: 110 MMHG

## 2018-01-23 NOTE — MISCELLANEOUS
Message   Recorded as Task   Date: 12/12/2017 01:48 PM, Created By: Catia Lema   Task Name: Go to Result   Assigned To: Kamilah Horner   Regarding Patient: Victoria Mariee, Status: In Progress   Comment:    Cristina Santa - 12 Dec 2017 1:48 PM     TASK CREATED  Please let Armaan Sanchez know that her pelvic ultrasound is completely normal and her IUD looks to be in normal position   Andra Stringer - 12 Dec 2017 1:51 PM     TASK IN PROGRESS   Andra Stringer - 12 Dec 2017 1:53 PM     TASK EDITED  Patient is aware of the u/s and mammo results  Active Problems    1  Allergic rhinitis (477 9) (J30 9)   2  Encounter for gynecological examination without abnormal finding (V72 31) (Z01 419)   3  Encounter for screening mammogram for malignant neoplasm of breast (V76 12)   (Z12 31)   4  Inversion, nipple (611 79) (N64 59)   5  Irregular bleeding (626 4) (N92 6)   6  Need for prophylactic vaccination against hepatitis B virus (V05 3) (Z23)   7  Presence of (intrauterine) contraceptive device (V45 51) (Z97 5)   8  Visit for screening mammogram (V76 12) (Z12 31)    Current Meds   1  Claritin 10 MG Oral Capsule; Therapy: (Recorded:99Den0606) to Recorded   2  ValACYclovir HCl - 1 GM Oral Tablet (Valtrex); TAKE 2 TABLETS AT FIRST SIGN OF   ERUPTION,THEN 2 TABLETS EVERY 12 HOURS for a total of 4 doses; Therapy: 94GZO7249 to (Mervin Jordan)  Requested for: 04YDF3251; Last   Rx:81Crk8388 Ordered   3  Womens One Daily TABS; Take 1 tablet daily; Therapy: (Recorded:60Nbb8611) to Recorded    Allergies    1  Penicillins   2   Unasyn SOLR    3  Seasonal    Signatures   Electronically signed by : Michelle Parker, ; Dec 12 2017  1:53PM EST                       (Author)

## 2018-02-09 ENCOUNTER — OFFICE VISIT (OUTPATIENT)
Dept: FAMILY MEDICINE CLINIC | Facility: CLINIC | Age: 43
End: 2018-02-09
Payer: COMMERCIAL

## 2018-02-09 VITALS
TEMPERATURE: 97.9 F | HEIGHT: 61 IN | WEIGHT: 125 LBS | RESPIRATION RATE: 12 BRPM | SYSTOLIC BLOOD PRESSURE: 104 MMHG | BODY MASS INDEX: 23.6 KG/M2 | DIASTOLIC BLOOD PRESSURE: 64 MMHG | HEART RATE: 82 BPM

## 2018-02-09 DIAGNOSIS — R05.9 COUGH: Primary | ICD-10-CM

## 2018-02-09 DIAGNOSIS — J32.9 OTHER SINUSITIS, UNSPECIFIED CHRONICITY: ICD-10-CM

## 2018-02-09 PROCEDURE — 99213 OFFICE O/P EST LOW 20 MIN: CPT | Performed by: NURSE PRACTITIONER

## 2018-02-09 PROCEDURE — 3008F BODY MASS INDEX DOCD: CPT | Performed by: NURSE PRACTITIONER

## 2018-02-09 RX ORDER — LORATADINE 10 MG/1
1 CAPSULE, LIQUID FILLED ORAL DAILY
COMMUNITY
End: 2018-11-29 | Stop reason: ALTCHOICE

## 2018-02-09 RX ORDER — AZITHROMYCIN 250 MG/1
250 TABLET, FILM COATED ORAL EVERY 24 HOURS
Qty: 6 TABLET | Refills: 0 | Status: SHIPPED | OUTPATIENT
Start: 2018-02-09 | End: 2018-02-14

## 2018-02-09 RX ORDER — PREDNISONE 20 MG/1
20 TABLET ORAL 2 TIMES DAILY WITH MEALS
Qty: 6 TABLET | Refills: 0 | Status: SHIPPED | OUTPATIENT
Start: 2018-02-09 | End: 2018-11-29 | Stop reason: ALTCHOICE

## 2018-02-09 RX ORDER — MULTIVIT WITH CALCIUM,IRON,MIN 27MG-0.4MG
1 TABLET ORAL DAILY
COMMUNITY
End: 2019-09-11 | Stop reason: ALTCHOICE

## 2018-02-09 RX ORDER — VALACYCLOVIR HYDROCHLORIDE 1 G/1
TABLET, FILM COATED ORAL
COMMUNITY
Start: 2017-09-22 | End: 2019-01-30 | Stop reason: SDUPTHER

## 2018-02-09 NOTE — PROGRESS NOTES
Chief Complaint   Patient presents with    Fatigue    Headache    Cough     Assessment/Plan:    1  Cough  We have prescribed short burst prednisone  Increase fluids and take mucinex  Call if you are not improving    - Peak flow; Future  - Peak flow    2  Other sinusitis, unspecified chronicity   steam , zpak and lots of fluids  Plain mucinex  Call if you are not getting better  - predniSONE 20 mg tablet; Take 1 tablet (20 mg total) by mouth 2 (two) times a day with meals  Dispense: 6 tablet; Refill: 0  - azithromycin (ZITHROMAX) 250 mg tablet; Take 1 tablet (250 mg total) by mouth every 24 hours for 5 days Take 2 pills today and one pill a day for the remaining 4 days  Dispense: 6 tablet; Refill: 0        Subjective:      Patient ID: Shimon Stanford is a 41 y o  female  Here today with complaint of cough and congestion ongoing on for the past 1 1/2 weeks  Has PND that is upsetting her stomcah    Has a lot cough and chest congestion  Is doing a lot of traveling  No fevers  Does feel tight and wheezing in her chest     has a heaviness and pressure  in her sinus   has some body aches but not really bad  Has been taking vit c and is also using advil for the headaches  The following portions of the patient's history were reviewed and updated as appropriate: allergies, current medications, past family history, past medical history, past social history, past surgical history and problem list     History reviewed  No pertinent past medical history  History reviewed  No pertinent surgical history  Family History   Problem Relation Age of Onset    No Known Problems Mother     Heart attack Father      Social History   Social History     Social History    Marital status: /Civil Union     Spouse name: N/A    Number of children: N/A    Years of education: N/A     Occupational History    Not on file       Social History Main Topics    Smoking status: Never Smoker    Smokeless tobacco: Never Used    Alcohol use No    Drug use: No    Sexual activity: Not on file     Other Topics Concern    Not on file     Social History Narrative    No narrative on file     Review of Systems   Constitutional: Negative  HENT: Positive for congestion, postnasal drip, sinus pain and sinus pressure  Negative for ear pain and sore throat  Respiratory: Positive for cough and wheezing  Negative for shortness of breath  Cardiovascular: Negative  Musculoskeletal: Negative  Psychiatric/Behavioral: Negative  Vitals:    02/09/18 1103   BP: 104/64   BP Location: Left arm   Patient Position: Sitting   Pulse: 82   Resp: 12   Temp: 97 9 °F (36 6 °C)   TempSrc: Oral   Weight: 56 7 kg (125 lb)   Height: 5' 1" (1 549 m)       Objective:         Physical Exam   Constitutional: She appears well-developed and well-nourished  HENT:   Head: Normocephalic and atraumatic  Right Ear: External ear normal    Left Ear: External ear normal    Nose: Nose normal    Mouth/Throat: Oropharynx is clear and moist    Throat with purulent PND  turbs inflamed with purulent discharge   Neck: Neck supple  Cardiovascular: Normal rate, regular rhythm and normal heart sounds  Pulmonary/Chest: Effort normal and breath sounds normal  No respiratory distress  She has no wheezes  Skin: Skin is warm and dry  Psychiatric: She has a normal mood and affect   Her behavior is normal  Judgment and thought content normal

## 2018-04-19 ENCOUNTER — TELEPHONE (OUTPATIENT)
Dept: FAMILY MEDICINE CLINIC | Facility: CLINIC | Age: 43
End: 2018-04-19

## 2018-04-19 NOTE — TELEPHONE ENCOUNTER
Patient was asking if she could have a note for work to get a new mobile desk  Patient states it would be because of back pain

## 2018-04-19 NOTE — TELEPHONE ENCOUNTER
Call pt: I have never treated her or seen her for back pain  she does usually see other providers in this office  Ask her if one of them have treated her for back pain and if so forward the note to them    If not,she will need an ov to discuss

## 2018-10-10 ENCOUNTER — TELEPHONE (OUTPATIENT)
Dept: FAMILY MEDICINE CLINIC | Facility: CLINIC | Age: 43
End: 2018-10-10

## 2018-11-29 ENCOUNTER — OFFICE VISIT (OUTPATIENT)
Dept: FAMILY MEDICINE CLINIC | Facility: CLINIC | Age: 43
End: 2018-11-29
Payer: COMMERCIAL

## 2018-11-29 VITALS
HEIGHT: 61 IN | BODY MASS INDEX: 21.71 KG/M2 | DIASTOLIC BLOOD PRESSURE: 62 MMHG | SYSTOLIC BLOOD PRESSURE: 100 MMHG | RESPIRATION RATE: 12 BRPM | HEART RATE: 70 BPM | WEIGHT: 115 LBS

## 2018-11-29 DIAGNOSIS — Z12.39 SCREENING FOR MALIGNANT NEOPLASM OF BREAST: Primary | ICD-10-CM

## 2018-11-29 DIAGNOSIS — J02.9 SORE THROAT: ICD-10-CM

## 2018-11-29 LAB — S PYO AG THROAT QL: NEGATIVE

## 2018-11-29 PROCEDURE — 1036F TOBACCO NON-USER: CPT | Performed by: NURSE PRACTITIONER

## 2018-11-29 PROCEDURE — 99213 OFFICE O/P EST LOW 20 MIN: CPT | Performed by: NURSE PRACTITIONER

## 2018-11-29 PROCEDURE — 3008F BODY MASS INDEX DOCD: CPT | Performed by: NURSE PRACTITIONER

## 2018-11-29 PROCEDURE — 87880 STREP A ASSAY W/OPTIC: CPT | Performed by: NURSE PRACTITIONER

## 2018-11-29 NOTE — PROGRESS NOTES
Assessment/Plan:    1  Screening for malignant neoplasm of breast  Script given   - Mammo screening bilateral w cad; Future    2  Sore throat  The rapid strep was neg / please use warm salt water gargles and rest  Call if you are not feeling better and we will call if the throat culture is positive  - POCT rapid strepA  - Throat culture; Future  - Throat culture    rto prn       Subjective:      Patient ID: Amrit Cormier is a 41 y o  female  Here today with complaint of a sore throat  Started 4 days ago with headache and stomach ache  Took advil and things seemed better  Yesterday started with the sore throat  Is doing saline gargels  No cough or chest congestion  No fevers           OBJECTIVE  Past Medical History:   Diagnosis Date    ASCUS with positive high risk HPV cervical     resolved 12/11/17      [facility]    Wt Readings from Last 3 Encounters:   11/29/18 52 2 kg (115 lb)   02/09/18 56 7 kg (125 lb)   01/19/18 56 7 kg (125 lb)     BP Readings from Last 3 Encounters:   11/29/18 100/62   02/09/18 104/64   01/12/18 108/68     Pulse Readings from Last 3 Encounters:   11/29/18 70   02/09/18 82   01/12/18 68     BMI Readings from Last 3 Encounters:   11/29/18 21 73 kg/m²   02/09/18 23 62 kg/m²   01/19/18 23 43 kg/m²        Physical Exam   Constitutional: She is oriented to person, place, and time  She appears well-developed and well-nourished  HENT:   Tm's clear  turbs opren no discharge  Pharynx benign   Neck: Normal range of motion  Neck supple  No thyromegaly present  Cardiovascular: Normal rate, regular rhythm, S1 normal and normal heart sounds  Exam reveals no distant heart sounds  Pulmonary/Chest: Effort normal and breath sounds normal  No respiratory distress  She has no wheezes  Musculoskeletal: Normal range of motion  Neurological: She is alert and oriented to person, place, and time  Skin: Skin is warm and dry  Psychiatric: She has a normal mood and affect   Her behavior is normal  Judgment and thought content normal

## 2019-01-30 DIAGNOSIS — B00.2 ORAL HERPES: Primary | ICD-10-CM

## 2019-01-30 RX ORDER — VALACYCLOVIR HYDROCHLORIDE 1 G/1
TABLET, FILM COATED ORAL
Qty: 8 TABLET | Refills: 6 | Status: SHIPPED | OUTPATIENT
Start: 2019-01-30 | End: 2021-06-28 | Stop reason: SDUPTHER

## 2019-02-08 ENCOUNTER — ANNUAL EXAM (OUTPATIENT)
Dept: OBGYN CLINIC | Facility: CLINIC | Age: 44
End: 2019-02-08
Payer: COMMERCIAL

## 2019-02-08 VITALS
HEIGHT: 61 IN | SYSTOLIC BLOOD PRESSURE: 102 MMHG | BODY MASS INDEX: 22.28 KG/M2 | DIASTOLIC BLOOD PRESSURE: 64 MMHG | WEIGHT: 118 LBS

## 2019-02-08 DIAGNOSIS — Z01.419 ENCNTR FOR GYN EXAM (GENERAL) (ROUTINE) W/O ABN FINDINGS: ICD-10-CM

## 2019-02-08 DIAGNOSIS — Z12.31 ENCOUNTER FOR SCREENING MAMMOGRAM FOR MALIGNANT NEOPLASM OF BREAST: ICD-10-CM

## 2019-02-08 PROCEDURE — 99396 PREV VISIT EST AGE 40-64: CPT | Performed by: PHYSICIAN ASSISTANT

## 2019-02-08 PROCEDURE — G0145 SCR C/V CYTO,THINLAYER,RESCR: HCPCS | Performed by: PHYSICIAN ASSISTANT

## 2019-02-08 PROCEDURE — 87624 HPV HI-RISK TYP POOLED RSLT: CPT | Performed by: PHYSICIAN ASSISTANT

## 2019-02-08 NOTE — PROGRESS NOTES
Alannah Bah  1975      CC:  Yearly exam    S:  45 y o  female here for yearly exam  Her cycles are regular, not heavy or crampy  She now gets some spotting 3-4 days prior to her period along with hot flashes  She is sexually active  She uses [employer] (12/2010) for contraception  She is considering a change to [place] next year, or earlier if she desires  Pap  5/1/15 ASCUS +HPV  Colpo benign  Ppa 10/20/16 neg/neg  Last Mammo 12/11/17 neg    Current Outpatient Prescriptions:     Multiple Vitamins-Minerals (WOMENS ONE DAILY) TABS, Take 1 tablet by mouth daily, Disp: , Rfl:     valACYclovir (VALTREX) 1,000 mg tablet, TAKE 2 TABLETS AT FIRST SIGN OF ERUPTION,THEN 2 TABLETS EVERY 12 HOURS for a total of 4 doses), Disp: 8 tablet, Rfl: 6  Social History     Social History    Marital status: /Civil Union     Spouse name: N/A    Number of children: N/A    Years of education: N/A     Occupational History    Not on file       Social History Main Topics    Smoking status: Never Smoker    Smokeless tobacco: Never Used    Alcohol use Yes      Comment: rarely    Drug use: No      Comment: drug use active past usage per [employer]scipt     Sexual activity: Yes     Birth control/ protection: IUD      Comment: presence of  intrauterine contraceptive device                          Other Topics Concern    Not on file     Social History Narrative    Daily coffee consumption 6 cups day    Uses safety equipment seatbelts     Family History   Problem Relation Age of Onset    No Known Problems Mother     Heart attack Father     Coronary artery disease Father     Hypertension Father     Diabetes Maternal Grandmother     Heart disease Maternal Grandmother     Liver disease Maternal Grandfather         chronic     Hepatitis Family     Ulcers Family     Other Family         urinary incontinence     Varicose Veins Family         of lower extremities     Substance Abuse Neg Hx     Mental illness Neg Hx Past Medical History:   Diagnosis Date    ASCUS with positive high risk HPV cervical     resolved 12/11/17     Chlamydia     HPV (human papilloma virus) infection     Migraine     Urinary tract infection         O:  Blood pressure 102/64, height 5' 1 02" (1 55 m), weight 53 5 kg (118 lb), last menstrual period 01/31/2019  Patient appears well and is not in distress  Neck is supple without masses  Breasts are symmetrical without mass, tenderness, nipple discharge, skin changes or adenopathy  Abdomen is soft and nontender without masses  External genitals are normal without lesions or rashes  Vagina is normal without discharge or bleeding  Cervix is normal without discharge or lesion  IUD strings are normal    Uterus is normal, mobile, nontender without palpable mass  Adnexa are normal, nontender, without palpable mass  A:  Yearly exam      P:   Pap and HPV today   Mammo slip provided    RTO one year for yearly exam or sooner as needed

## 2019-02-13 LAB
HPV HR 12 DNA CVX QL NAA+PROBE: NEGATIVE
HPV16 DNA CVX QL NAA+PROBE: NEGATIVE
HPV18 DNA CVX QL NAA+PROBE: NEGATIVE

## 2019-02-14 LAB
LAB AP GYN PRIMARY INTERPRETATION: NORMAL
Lab: NORMAL

## 2019-04-10 ENCOUNTER — APPOINTMENT (OUTPATIENT)
Dept: RADIOLOGY | Facility: CLINIC | Age: 44
End: 2019-04-10
Payer: COMMERCIAL

## 2019-04-10 ENCOUNTER — OFFICE VISIT (OUTPATIENT)
Dept: OBGYN CLINIC | Facility: CLINIC | Age: 44
End: 2019-04-10
Payer: COMMERCIAL

## 2019-04-10 VITALS
DIASTOLIC BLOOD PRESSURE: 74 MMHG | HEART RATE: 78 BPM | SYSTOLIC BLOOD PRESSURE: 111 MMHG | BODY MASS INDEX: 21.53 KG/M2 | HEIGHT: 62 IN | WEIGHT: 117 LBS

## 2019-04-10 DIAGNOSIS — M54.16 LUMBAR RADICULOPATHY: ICD-10-CM

## 2019-04-10 DIAGNOSIS — M46.1 SACROILIITIS (HCC): Primary | ICD-10-CM

## 2019-04-10 DIAGNOSIS — M54.50 ACUTE RIGHT-SIDED LOW BACK PAIN WITHOUT SCIATICA: ICD-10-CM

## 2019-04-10 PROCEDURE — 99204 OFFICE O/P NEW MOD 45 MIN: CPT | Performed by: ORTHOPAEDIC SURGERY

## 2019-04-10 PROCEDURE — 72110 X-RAY EXAM L-2 SPINE 4/>VWS: CPT

## 2019-04-24 ENCOUNTER — TRANSCRIBE ORDERS (OUTPATIENT)
Dept: RADIOLOGY | Facility: HOSPITAL | Age: 44
End: 2019-04-24

## 2019-04-24 ENCOUNTER — HOSPITAL ENCOUNTER (OUTPATIENT)
Dept: RADIOLOGY | Facility: HOSPITAL | Age: 44
Discharge: HOME | End: 2019-04-24
Attending: ORTHOPAEDIC SURGERY
Payer: COMMERCIAL

## 2019-04-24 DIAGNOSIS — M46.1 SACROILIITIS (HCC): ICD-10-CM

## 2019-04-24 PROCEDURE — G0260 INJ FOR SACROILIAC JT ANESTH: HCPCS

## 2019-04-24 RX ORDER — METHYLPREDNISOLONE ACETATE 80 MG/ML
80 INJECTION, SUSPENSION INTRA-ARTICULAR; INTRALESIONAL; INTRAMUSCULAR; SOFT TISSUE ONCE
Status: COMPLETED | OUTPATIENT
Start: 2019-04-24 | End: 2019-04-24

## 2019-04-24 RX ORDER — BUPIVACAINE HYDROCHLORIDE 2.5 MG/ML
2 INJECTION, SOLUTION EPIDURAL; INFILTRATION; INTRACAUDAL ONCE
Status: COMPLETED | OUTPATIENT
Start: 2019-04-24 | End: 2019-04-24

## 2019-04-24 RX ADMIN — METHYLPREDNISOLONE ACETATE 80 MG: 80 INJECTION, SUSPENSION INTRA-ARTICULAR; INTRALESIONAL; INTRAMUSCULAR; SOFT TISSUE at 10:01

## 2019-04-24 RX ADMIN — BUPIVACAINE HYDROCHLORIDE 2 ML: 2.5 INJECTION, SOLUTION EPIDURAL; INFILTRATION; INTRACAUDAL; PERINEURAL at 10:01

## 2019-05-02 ENCOUNTER — HOSPITAL ENCOUNTER (OUTPATIENT)
Dept: MRI IMAGING | Facility: HOSPITAL | Age: 44
Discharge: HOME | End: 2019-05-02
Attending: ORTHOPAEDIC SURGERY
Payer: COMMERCIAL

## 2019-05-02 DIAGNOSIS — M54.50 ACUTE RIGHT-SIDED LOW BACK PAIN WITHOUT SCIATICA: ICD-10-CM

## 2019-05-02 DIAGNOSIS — M46.1 SACROILIITIS (HCC): ICD-10-CM

## 2019-05-02 DIAGNOSIS — M54.16 LUMBAR RADICULOPATHY: ICD-10-CM

## 2019-05-02 PROCEDURE — 72148 MRI LUMBAR SPINE W/O DYE: CPT

## 2019-05-06 ENCOUNTER — OFFICE VISIT (OUTPATIENT)
Dept: OBGYN CLINIC | Facility: HOSPITAL | Age: 44
End: 2019-05-06
Payer: COMMERCIAL

## 2019-05-06 VITALS
HEART RATE: 79 BPM | DIASTOLIC BLOOD PRESSURE: 73 MMHG | HEIGHT: 62 IN | BODY MASS INDEX: 21.53 KG/M2 | WEIGHT: 117 LBS | SYSTOLIC BLOOD PRESSURE: 112 MMHG

## 2019-05-06 DIAGNOSIS — M46.1 SACROILIITIS (HCC): Primary | ICD-10-CM

## 2019-05-06 PROCEDURE — 99213 OFFICE O/P EST LOW 20 MIN: CPT | Performed by: ORTHOPAEDIC SURGERY

## 2019-07-24 ENCOUNTER — OFFICE VISIT (OUTPATIENT)
Dept: FAMILY MEDICINE CLINIC | Facility: CLINIC | Age: 44
End: 2019-07-24
Payer: COMMERCIAL

## 2019-07-24 VITALS
DIASTOLIC BLOOD PRESSURE: 78 MMHG | RESPIRATION RATE: 15 BRPM | HEART RATE: 70 BPM | BODY MASS INDEX: 23.28 KG/M2 | HEIGHT: 61 IN | SYSTOLIC BLOOD PRESSURE: 114 MMHG | WEIGHT: 123.3 LBS

## 2019-07-24 DIAGNOSIS — Z00.00 ANNUAL PHYSICAL EXAM: Primary | ICD-10-CM

## 2019-07-24 PROCEDURE — 99396 PREV VISIT EST AGE 40-64: CPT | Performed by: PHYSICIAN ASSISTANT

## 2019-07-24 NOTE — PROGRESS NOTES
ADULT ANNUAL PHYSICAL  [facility] - [address] [facility]    NAME: Paula Reason  AGE: 45 y o  SEX: female  : 1975     DATE: 2019     Assessment and Plan:         Immunizations and preventive care screenings were discussed with patient today  Appropriate education was printed on patient's after visit summary  Counseling:  Alcohol/drug use: discussed moderation in alcohol intake and avoidance of illicit drug use  Dental Health: discussed importance of regular tooth brushing, flossing, and dental visits  · Injury prevention: discussed safety/seat belts, safety helmets, smoke detectors, carbon dioxide detectors, and smoking near bedding or upholstery  · Mammo - due now  · Sacroiliitis - refer to Jalen Alvarez for PT eval, rx given for ergonomic desk    Return in 1 year (on 2020)  Chief Complaint:     Chief Complaint   Patient presents with    Physical Exam      History of Present Illness:     Adult Annual Physical   Patient here for a comprehensive physical exam  The patient reports problems of Sacroiliitis, following with ortho, had injections still just a dull limiting ache all the time  Diet and Physical Activity  · Diet/Nutrition: well balanced diet  · Exercise: moderate cardiovascular exercise and 3-4 times a week on average  Depression Screening  PHQ-9 Depression Screening    PHQ-9:    Frequency of the following problems over the past two weeks:       Little interest or pleasure in doing things:  0 - not at all  Feeling down, depressed, or hopeless:  0 - not at all  PHQ-2 Score:  0       General Health  · Sleep: gets 7-8 hours of sleep on average  · Hearing: normal - bilateral   · Vision: goes for regular eye exams, most recent eye exam <1 year ago and wears glasses  · Dental: regular dental visits and brushes teeth twice daily         /GYN Health  · Patient is: perimenopausal  · PAP normal 2019  · Mammo due  · Review of Systems:     Review of Systems   Constitutional: Negative  HENT: Negative  Eyes: Negative  Respiratory: Negative  Cardiovascular: Negative  Gastrointestinal: Negative  Endocrine: Negative  Genitourinary: Negative  Musculoskeletal: Negative  Skin: Negative  Allergic/Immunologic: Negative  Neurological: Negative  Hematological: Negative  Psychiatric/Behavioral: Negative         Past Medical History:     Past Medical History:   Diagnosis Date    ASCUS with positive high risk HPV cervical     resolved 12/11/17     Chlamydia     HPV (human papilloma virus) infection     Migraine     Urinary tract infection       Past Surgical History:     Past Surgical History:   Procedure Laterality Date    CERVICAL BIOPSY  W/ LOOP ELECTRODE EXCISION      COLPOSCOPY W/ BIOPSY / CURETTAGE      coloposcopy cervix with biopsy with endocervical curettage 6/1/15 colpo chronic cervicitis     ENDOMETRIAL BIOPSY      without cervical dilation Eloisa Cheung 2010    PILONIDAL CYST DRAINAGE  2006    incision and drainage of pilonidal cyst simple       Social History:     Social History     Socioeconomic History    Marital status: /Civil Union     Spouse name: None    Number of children: None    Years of education: None    Highest education level: None   Occupational History    None   Social Needs    Financial resource strain: None    Food insecurity:     Worry: None     Inability: None    Transportation needs:     Medical: None     Non-medical: None   Tobacco Use    Smoking status: Never Smoker    Smokeless tobacco: Never Used   Substance and Sexual Activity    Alcohol use: Yes     Comment: rarely    Drug use: No     Comment: drug use active past usage per allscipt     Sexual activity: Yes     Birth control/protection: IUD     Comment: presence of  intrauterine contraceptive device                        Lifestyle    Physical activity:     Days per week: None     Minutes per session: None    Stress: None   Relationships    Social connections:     Talks on phone: None     Gets together: None     Attends Protestant service: None     Active member of club or organization: None     Attends meetings of clubs or organizations: None     Relationship status: None    Intimate partner violence:     Fear of current or ex partner: None     Emotionally abused: None     Physically abused: None     Forced sexual activity: None   Other Topics Concern    None   Social History Narrative    Daily coffee consumption 6 cups day    Uses safety equipment seatbelts      Family History:     Family History   Problem Relation Age of Onset    No Known Problems Mother     Heart attack Father     Coronary artery disease Father     Hypertension Father     Diabetes Maternal Grandmother     Heart disease Maternal Grandmother     Liver disease Maternal Grandfather         chronic     Hepatitis Family     Ulcers Family     Other Family         urinary incontinence     Varicose Veins Family         of lower extremities     Substance Abuse Neg Hx     Mental illness Neg Hx       Current Medications:     Current Outpatient Medications   Medication Sig Dispense Refill    Multiple Vitamins-Minerals (WOMENS ONE DAILY) TABS Take 1 tablet by mouth daily      valACYclovir (VALTREX) 1,000 mg tablet TAKE 2 TABLETS AT FIRST SIGN OF ERUPTION,THEN 2 TABLETS EVERY 12 HOURS for a total of 4 doses) 8 tablet 6     No current facility-administered medications for this visit  Allergies: Allergies   Allergen Reactions    Ampicillin-Sulbactam Sodium Rash     Category: Allergy;     Penicillins Rash     Category:  Allergy;     Pollen Extract       Physical Exam:     /78 (BP Location: Left arm, Patient Position: Sitting, Cuff Size: Standard)   Pulse 70   Resp 15   Ht 5' 1" (1 549 m)   Wt 55 9 kg (123 lb 4 8 oz)   BMI 23 30 kg/m²     Physical Exam   Constitutional: She is oriented to person, place, and time  She appears well-developed and well-nourished  HENT:   Head: Normocephalic and atraumatic  Right Ear: External ear normal    Left Ear: External ear normal    Nose: Nose normal    Mouth/Throat: Oropharynx is clear and moist    Eyes: Pupils are equal, round, and reactive to light  Conjunctivae and EOM are normal    Neck: Normal range of motion  Neck supple  No thyromegaly present  Cardiovascular: Normal rate, regular rhythm, normal heart sounds and intact distal pulses  No murmur heard  Pulmonary/Chest: Effort normal and breath sounds normal  No respiratory distress  She has no wheezes  She has no rales  Abdominal: Soft  Bowel sounds are normal  She exhibits no distension and no mass  There is no tenderness  Musculoskeletal: Normal range of motion  She exhibits no edema  Lymphadenopathy:     She has no cervical adenopathy  Neurological: She is alert and oriented to person, place, and time  She has normal reflexes  No cranial nerve deficit  Skin: Skin is warm and dry  Psychiatric: She has a normal mood and affect   Judgment normal        Han Richard PA-C  [address]

## 2019-07-24 NOTE — PATIENT INSTRUCTIONS

## 2019-09-11 ENCOUNTER — OFFICE VISIT (OUTPATIENT)
Dept: FAMILY MEDICINE CLINIC | Facility: CLINIC | Age: 44
End: 2019-09-11
Payer: COMMERCIAL

## 2019-09-11 VITALS
BODY MASS INDEX: 23.41 KG/M2 | SYSTOLIC BLOOD PRESSURE: 100 MMHG | TEMPERATURE: 98.1 F | RESPIRATION RATE: 14 BRPM | HEIGHT: 61 IN | DIASTOLIC BLOOD PRESSURE: 70 MMHG | WEIGHT: 124 LBS | HEART RATE: 74 BPM

## 2019-09-11 DIAGNOSIS — L72.0 EPIDERMAL INCLUSION CYST: ICD-10-CM

## 2019-09-11 DIAGNOSIS — L08.9 INFECTED CYST OF SKIN: Primary | ICD-10-CM

## 2019-09-11 DIAGNOSIS — L72.9 INFECTED CYST OF SKIN: Primary | ICD-10-CM

## 2019-09-11 PROCEDURE — 99213 OFFICE O/P EST LOW 20 MIN: CPT | Performed by: FAMILY MEDICINE

## 2019-09-11 RX ORDER — SULFAMETHOXAZOLE AND TRIMETHOPRIM 800; 160 MG/1; MG/1
1 TABLET ORAL 2 TIMES DAILY WITH MEALS
Qty: 20 TABLET | Refills: 0 | Status: SHIPPED | OUTPATIENT
Start: 2019-09-11 | End: 2019-09-21

## 2019-09-11 NOTE — PROGRESS NOTES
Assessment/Plan:    1  Infected cyst of skin  - keep the sites clean, wash twice daily with soap and water, apply warm compresses to right groin area, start Bactrim, wound culture obtained and we will call with results, follow up if symptoms not better, may need I&D  - sulfamethoxazole-trimethoprim (BACTRIM DS) 800-160 mg per tablet; Take 1 tablet by mouth 2 (two) times a day with meals for 10 days  Dispense: 20 tablet; Refill: 0  - Culture, Aerobic Bacteria       Possible side effects of new medications were reviewed with the patient today  The treatment plan was reviewed with the patient  The patient understands and agrees with the treatment plan        Subjective:   Chief Complaint   Patient presents with    Wound Infection     pubic area      Patient ID: Taya Krueger is a 45 y o  female who presents today with complaining of painful lump on her suprapubic area onset 3 days ago, she was using warm compresses and yesterday the area opened and started draining  Patient states that pain and swelling now improved and today she has slight drainage, but less than yesterday  Patient also now noticed a tender lump in her right groin, no drainage, no fever or chills  Patient is going on business trip tonight and will be returning Monday         The following portions of the patient's history were reviewed and updated as appropriate: allergies, current medications, past family history, past medical history, past social history, past surgical history and problem list     Past Medical History:   Diagnosis Date    ASCUS with positive high risk HPV cervical     resolved 12/11/17     Chlamydia     HPV (human papilloma virus) infection     Migraine     Urinary tract infection      Past Surgical History:   Procedure Laterality Date    CERVICAL BIOPSY  W/ LOOP ELECTRODE EXCISION      COLPOSCOPY W/ BIOPSY / CURETTAGE      coloposcopy cervix with biopsy with endocervical curettage 6/1/15 colpo chronic cervicitis     ENDOMETRIAL BIOPSY      without cervical dilation Binta Jaime 2010    PILONIDAL CYST DRAINAGE  2006    incision and drainage of pilonidal cyst simple      Family History   Problem Relation Age of Onset    No Known Problems Mother     Heart attack Father     Coronary artery disease Father     Hypertension Father     Diabetes Maternal Grandmother     Heart disease Maternal Grandmother     Liver disease Maternal Grandfather         chronic     Hepatitis Family     Ulcers Family     Other Family         urinary incontinence     Varicose Veins Family         of lower extremities     Substance Abuse Neg Hx     Mental illness Neg Hx      Social History     Socioeconomic History    Marital status: /Civil Union     Spouse name: Not on file    Number of children: Not on file    Years of education: Not on file    Highest education level: Not on file   Occupational History    Not on file   Social Needs    Financial resource strain: Not on file    Food insecurity:     Worry: Not on file     Inability: Not on file    Transportation needs:     Medical: Not on file     Non-medical: Not on file   Tobacco Use    Smoking status: Never Smoker    Smokeless tobacco: Never Used   Substance and Sexual Activity    Alcohol use: Yes     Comment: rarely    Drug use: No     Comment: drug use active past usage per allscipt     Sexual activity: Yes     Birth control/protection: IUD     Comment: presence of  intrauterine contraceptive device                        Lifestyle    Physical activity:     Days per week: Not on file     Minutes per session: Not on file    Stress: Not on file   Relationships    Social connections:     Talks on phone: Not on file     Gets together: Not on file     Attends Mandaeism service: Not on file     Active member of club or organization: Not on file     Attends meetings of clubs or organizations: Not on file     Relationship status: Not on file    Intimate partner violence: Fear of current or ex partner: Not on file     Emotionally abused: Not on file     Physically abused: Not on file     Forced sexual activity: Not on file   Other Topics Concern    Not on file   Social History Narrative    Daily coffee consumption 6 cups day    Uses safety equipment seatbelts       Current Outpatient Medications:     sulfamethoxazole-trimethoprim (BACTRIM DS) 800-160 mg per tablet, Take 1 tablet by mouth 2 (two) times a day with meals for 10 days, Disp: 20 tablet, Rfl: 0    valACYclovir (VALTREX) 1,000 mg tablet, TAKE 2 TABLETS AT FIRST SIGN OF ERUPTION,THEN 2 TABLETS EVERY 12 HOURS for a total of 4 doses), Disp: 8 tablet, Rfl: 6    Review of Systems   Constitutional: Negative for chills, fatigue and fever  Skin:        Tender nodules in right groin and suprapubic area       Objective:    Vitals:    09/11/19 1039   BP: 100/70   BP Location: Left arm   Patient Position: Sitting   Cuff Size: Standard   Pulse: 74   Resp: 14   Temp: 98 1 °F (36 7 °C)   Weight: 56 2 kg (124 lb)   Height: 5' 1 25" (1 556 m)        Physical Exam   Constitutional: She appears well-developed and well-nourished  No distress     Skin:   Suprapubic area with small open area with slight serosanguineous oozing and minimal surrounding erythema  Right groin with about 6 mm tender non fluctuant erythematous nodule, no drainage or surrounding erythema

## 2019-09-16 ENCOUNTER — TELEPHONE (OUTPATIENT)
Dept: FAMILY MEDICINE CLINIC | Facility: CLINIC | Age: 44
End: 2019-09-16

## 2019-09-16 NOTE — TELEPHONE ENCOUNTER
----- Message from Yvette Monroe MD sent at 9/16/2019  4:36 PM EDT -----  Please inform patient her skin culture was consistent with staph infection, she should continue her Bactrim until finished

## 2019-10-04 ENCOUNTER — OFFICE VISIT (OUTPATIENT)
Dept: FAMILY MEDICINE CLINIC | Facility: CLINIC | Age: 44
End: 2019-10-04
Payer: COMMERCIAL

## 2019-10-04 VITALS
BODY MASS INDEX: 23.73 KG/M2 | HEART RATE: 80 BPM | DIASTOLIC BLOOD PRESSURE: 74 MMHG | WEIGHT: 125.7 LBS | HEIGHT: 61 IN | SYSTOLIC BLOOD PRESSURE: 112 MMHG | RESPIRATION RATE: 15 BRPM

## 2019-10-04 DIAGNOSIS — J30.9 ALLERGIC RHINITIS, UNSPECIFIED SEASONALITY, UNSPECIFIED TRIGGER: Primary | ICD-10-CM

## 2019-10-04 DIAGNOSIS — H69.81 EUSTACHIAN TUBE DYSFUNCTION, RIGHT: ICD-10-CM

## 2019-10-04 PROCEDURE — 99213 OFFICE O/P EST LOW 20 MIN: CPT | Performed by: PHYSICIAN ASSISTANT

## 2019-10-04 RX ORDER — LORATADINE 10 MG/1
10 TABLET ORAL DAILY
COMMUNITY
End: 2021-05-21 | Stop reason: ALTCHOICE

## 2019-10-04 RX ORDER — AZELASTINE 1 MG/ML
1 SPRAY, METERED NASAL 2 TIMES DAILY
Qty: 1 BOTTLE | Refills: 0 | Status: SHIPPED | OUTPATIENT
Start: 2019-10-04 | End: 2020-03-13 | Stop reason: ALTCHOICE

## 2019-10-04 NOTE — PROGRESS NOTES
Assessment/Plan:    1  Allergic rhinitis, unspecified seasonality, unspecified trigger    - switch Claritin to Zyrtec 10 mg and add Rhinocort Aq for a week, if this does not help or if you have the headache reaction to rhinocort as you do flonase try Astelin 2 sprays each nostril twice daily, this will be at the pharmacy  - azelastine (ASTELIN) 0 1 % nasal spray; 1 spray into each nostril 2 (two) times a day Use in each nostril as directed  Dispense: 1 Bottle; Refill: 0  - budesonide (RINOCORT AQUA) 32 MCG/ACT nasal spray; 2 sprays into each nostril daily  Dispense: 1 Bottle; Refill: 1    2  Eustachian tube dysfunction, right    - as above  - azelastine (ASTELIN) 0 1 % nasal spray; 1 spray into each nostril 2 (two) times a day Use in each nostril as directed  Dispense: 1 Bottle; Refill: 0  - budesonide (RINOCORT AQUA) 32 MCG/ACT nasal spray; 2 sprays into each nostril daily  Dispense: 1 Bottle; Refill: 1    F/u as needed      Subjective:   Chief Complaint   Patient presents with    Ear Fullness     right       Patient ID: Anibal Patel is a 45 y o  female  Wednesday afternoon was in a meeting noted she could not hear normally out of right ear  Tried OTC ear wax drops with no relief  Denies nasal congestion, cold like symptoms or allergies        The following portions of the patient's history were reviewed and updated as appropriate: allergies, current medications, past family history, past medical history, past social history, past surgical history and problem list     Past Medical History:   Diagnosis Date    ASCUS with positive high risk HPV cervical     resolved 12/11/17     Chlamydia     HPV (human papilloma virus) infection     Migraine     Urinary tract infection      Past Surgical History:   Procedure Laterality Date    CERVICAL BIOPSY  W/ LOOP ELECTRODE EXCISION      COLPOSCOPY W/ BIOPSY / CURETTAGE      coloposcopy cervix with biopsy with endocervical curettage 6/1/15 colpo chronic cervicitis     ENDOMETRIAL BIOPSY      without cervical dilation Camelia Listen 2010    PILONIDAL CYST DRAINAGE  2006    incision and drainage of pilonidal cyst simple      Family History   Problem Relation Age of Onset    No Known Problems Mother     Heart attack Father     Coronary artery disease Father     Hypertension Father     Diabetes Maternal Grandmother     Heart disease Maternal Grandmother     Liver disease Maternal Grandfather         chronic     Hepatitis Family     Ulcers Family     Other Family         urinary incontinence     Varicose Veins Family         of lower extremities     Substance Abuse Neg Hx     Mental illness Neg Hx      Social History     Socioeconomic History    Marital status: /Civil Union     Spouse name: Not on file    Number of children: Not on file    Years of education: Not on file    Highest education level: Not on file   Occupational History    Not on file   Social Needs    Financial resource strain: Not on file    Food insecurity:     Worry: Not on file     Inability: Not on file    Transportation needs:     Medical: Not on file     Non-medical: Not on file   Tobacco Use    Smoking status: Never Smoker    Smokeless tobacco: Never Used   Substance and Sexual Activity    Alcohol use: Yes     Comment: rarely    Drug use: No     Comment: drug use active past usage per allscipt     Sexual activity: Yes     Birth control/protection: IUD     Comment: presence of  intrauterine contraceptive device                        Lifestyle    Physical activity:     Days per week: Not on file     Minutes per session: Not on file    Stress: Not on file   Relationships    Social connections:     Talks on phone: Not on file     Gets together: Not on file     Attends Yarsanism service: Not on file     Active member of club or organization: Not on file     Attends meetings of clubs or organizations: Not on file     Relationship status: Not on file    Intimate partner violence:     Fear of current or ex partner: Not on file     Emotionally abused: Not on file     Physically abused: Not on file     Forced sexual activity: Not on file   Other Topics Concern    Not on file   Social History Narrative    Daily coffee consumption 6 cups day    Uses safety equipment seatbelts       Current Outpatient Medications:     loratadine (CLARITIN) 10 mg tablet, Take 10 mg by mouth daily, Disp: , Rfl:     valACYclovir (VALTREX) 1,000 mg tablet, TAKE 2 TABLETS AT FIRST SIGN OF ERUPTION,THEN 2 TABLETS EVERY 12 HOURS for a total of 4 doses), Disp: 8 tablet, Rfl: 6    Review of Systems          Objective:    Vitals:    10/04/19 1006   BP: 112/74   BP Location: Left arm   Patient Position: Sitting   Cuff Size: Standard   Pulse: 80   Resp: 15   Weight: 57 kg (125 lb 11 2 oz)   Height: 5' 1 25" (1 556 m)        Physical Exam       Constitutional:is oriented to person, place, and time  She appears well-developed and well-nourished  HENT:   Head: Normocephalic and atraumatic  Right Ear: Tympanic membrane, external ear and ear canal normal    Left Ear: Tympanic membrane, external ear and ear canal normal    Mouth/Throat: Oropharynx is clear and moist    Turbinates pink and boggy with congestion, clear mucus   Cardiovascular: Normal rate, regular rhythm and normal heart sounds  Pulmonary/Chest: Effort normal and breath sounds normal    Lymphadenopathy:    no cervical adenopathy  Neurological:  is alert and oriented to person, place, and time  Skin: Skin is warm  Psych: has a normal mood and affect

## 2019-10-08 ENCOUNTER — HOSPITAL ENCOUNTER (EMERGENCY)
Facility: HOSPITAL | Age: 44
Discharge: HOME | End: 2019-10-08
Attending: EMERGENCY MEDICINE | Admitting: EMERGENCY MEDICINE
Payer: COMMERCIAL

## 2019-10-08 ENCOUNTER — TELEPHONE (OUTPATIENT)
Dept: FAMILY MEDICINE CLINIC | Facility: CLINIC | Age: 44
End: 2019-10-08

## 2019-10-08 VITALS
TEMPERATURE: 97.9 F | DIASTOLIC BLOOD PRESSURE: 67 MMHG | OXYGEN SATURATION: 100 % | SYSTOLIC BLOOD PRESSURE: 162 MMHG | RESPIRATION RATE: 16 BRPM | HEART RATE: 89 BPM

## 2019-10-08 DIAGNOSIS — T78.40XA ALLERGIC REACTION: Primary | ICD-10-CM

## 2019-10-08 LAB
ATRIAL RATE: 84 BPM
P AXIS: 78 DEGREES
PR INTERVAL: 164 MS
QRS AXIS: 87 DEGREES
QRSD INTERVAL: 74 MS
QT INTERVAL: 368 MS
QTC INTERVAL: 434 MS
T WAVE AXIS: 72 DEGREES
VENTRICULAR RATE: 84 BPM

## 2019-10-08 PROCEDURE — 99283 EMERGENCY DEPT VISIT LOW MDM: CPT

## 2019-10-08 PROCEDURE — 93010 ELECTROCARDIOGRAM REPORT: CPT | Performed by: INTERNAL MEDICINE

## 2019-10-08 PROCEDURE — 99284 EMERGENCY DEPT VISIT MOD MDM: CPT | Performed by: EMERGENCY MEDICINE

## 2019-10-08 PROCEDURE — 93005 ELECTROCARDIOGRAM TRACING: CPT

## 2019-10-08 RX ORDER — PREDNISONE 20 MG/1
40 TABLET ORAL ONCE
Status: COMPLETED | OUTPATIENT
Start: 2019-10-08 | End: 2019-10-08

## 2019-10-08 RX ORDER — EPINEPHRINE 0.3 MG/.3ML
0.3 INJECTION SUBCUTANEOUS ONCE
Qty: 0.6 ML | Refills: 0 | Status: SHIPPED | OUTPATIENT
Start: 2019-10-08 | End: 2021-06-28

## 2019-10-08 RX ORDER — DIPHENHYDRAMINE HCL 25 MG
25 TABLET ORAL ONCE
Status: COMPLETED | OUTPATIENT
Start: 2019-10-08 | End: 2019-10-08

## 2019-10-08 RX ORDER — PREDNISONE 20 MG/1
40 TABLET ORAL DAILY
Qty: 15 TABLET | Refills: 0 | Status: SHIPPED | OUTPATIENT
Start: 2019-10-08 | End: 2019-10-10 | Stop reason: ALTCHOICE

## 2019-10-08 RX ADMIN — DIPHENHYDRAMINE HCL 25 MG: 25 TABLET, COATED ORAL at 13:45

## 2019-10-08 RX ADMIN — PREDNISONE 40 MG: 20 TABLET ORAL at 14:23

## 2019-10-08 NOTE — ED ATTENDING ATTESTATION
10/8/2019  IMarisela MD, saw and evaluated the patient  I have discussed the patient with the resident and agree with the resident's findings, Plan of Care, and MDM as documented in the resident's note, unless otherwise documented below  All available labs and Radiology studies were reviewed by myself  I was present for key portions of any procedure(s) performed by the resident and I was immediately available to provide assistance  I agree with the current assessment done in the Emergency Department  I have conducted an independent evaluation of this patient  Briefly, this is a 45 y o  female presenting with a hoarse voice, throat tightness and chest tightness  Patient is battling what appears to be seasonal allergies  Her usual claritin has not helped and she tried using a  zyrtec and a cough medication (guiafenesin and dextromethorphan) for the first time today  Shortly after taking these for the first time, she developed a sensation of throat tightness, hoarse voice, and chest tightness  No shortness of breath  No hives  She is presenting for further evaluation  She has no personal history of cardiac disease, although her father and maternal grandmother have CAD  Physical Exam  Vitals:    10/08/19 1222   BP: 162/67   TempSrc: Oral   Pulse: 89   Resp: 16   Patient Position - Orthostatic VS: Sitting   Temp: 97 9 °F (36 6 °C)     Constitutional:  Awake, alert, oriented  No acute distress  HEENT:  Normocephalic, atraumatic  Sclera anicteric, conjunctiva not injected  No lip, tongue or throat swelling, no uvula hydrops, however, voice sounds somewhat hoarse  Moist oral mucosa  Cardiac:  Regular rate and rhythm, no murmurs, rubs, or gallops  2+ radial pulses  Respiratory:  Lungs are clear to auscultation bilaterally, no wheezes or rales  Abdomen:  Nondistended  Extremities:  No deformities  Integument:  No rashes or exposed areas, cap refill less than 2 seconds    Neurologic:  Awake, alert, and oriented x3  Nonfocal exam   Psychiatric:  Normal affect    ED Course    EKG:  Normal sinus rhythm, ventricular rate 84, ID interval 164, QRS 74, , normal axis, slight ID depression in inferior leads likely inconsequential, no ST/T-wave changes to suggest ischemia, no STEMI  45 y o  female presenting with an allergic reaction to either Zyrtec or to guaifenesin-dextromethorphan  Although she has some voice changes, at present, she is well appearing and we will hold off on epinephrine  Benadryl and prednisone administered for allergic reaction  EKG obtained to ensure chest tightness is solely secondary to allergic reaction and not due to an ischemic event  EKG is as above  Patient has a most 1 risk factor through her family disease, her history is not suspicious for cardiac event, she has a normal EKG, she is under age 41  We deferred labs including troponin today, which I feel is appropriate  As a result, I am not able to calculate heart score, although I anticipate it is low, placing patient at a low, 1 5% risk of major adverse cardiac event within the next 6 weeks  Following Benadryl and prednisone, patient feels improved  She is deemed safe to discharge to home with recommendations to take Benadryl as needed for the next several days, 2 additional days of prednisone, as well as a prescription for an EpiPen  Follow up with primary care physician  Return to emergency department with new or worsening symptoms    Clinical Impression  Final diagnoses:    Allergic reaction

## 2019-10-08 NOTE — ED PROVIDER NOTES
History  Chief Complaint   Patient presents with    Sore Throat     pt reports "attempting to drain sinuses", took cough syrup, netti-pot, and allergy meds throughout the day  pt c/o sore throat, hoarse voice, and "feeling like my throat is closing"     80-year-old woman presents for evaluation of multiple complaints including throat swelling  Patient has been experiencing symptoms likely related to allergic rhinitis including nasal congestion, right ear fullness, decreased hearing rhinorrhea  She was taking daily clear 10 but this does not affect then she went to her PCP for recommendations of new medications  She was switched to Zyrtec to improve the symptoms  She also tried to use a Neti pot with the mental add to give, dextromethorphan/guaifenesin cough syrup  He has all these medications today  Shortly after taking the cocktail she experienced nausea, diaphoresis, palpitations, lightheadedness  She knows her throat was closing and had a change in her voice  She came here for evaluation as she was concerned she was having allergic reaction  Exposure happened approximately 3 hours ago  She denies prior similar episode  Denies fever chills, nausea, vomiting, diarrhea, shortness of breath, chest pain, stridor currently  Prior to Admission Medications   Prescriptions Last Dose Informant Patient Reported?  Taking?   azelastine (ASTELIN) 0 1 % nasal spray   No No   Si spray into each nostril 2 (two) times a day Use in each nostril as directed   budesonide (RINOCORT AQUA) 32 MCG/ACT nasal spray   No No   Si sprays into each nostril daily   loratadine (CLARITIN) 10 mg tablet   Yes No   Sig: Take 10 mg by mouth daily   valACYclovir (VALTREX) 1,000 mg tablet  Self No No   Sig: TAKE 2 TABLETS AT FIRST SIGN OF ERUPTION,THEN 2 TABLETS EVERY 12 HOURS for a total of 4 doses)      Facility-Administered Medications: None       Past Medical History:   Diagnosis Date    ASCUS with positive high risk HPV cervical     resolved 12/11/17     Chlamydia     HPV (human papilloma virus) infection     Migraine     Urinary tract infection        Past Surgical History:   Procedure Laterality Date    CERVICAL BIOPSY  W/ LOOP ELECTRODE EXCISION      COLPOSCOPY W/ BIOPSY / CURETTAGE      coloposcopy cervix with biopsy with endocervical curettage 6/1/15 colpo chronic cervicitis     ENDOMETRIAL BIOPSY      without cervical dilation Homero Paredes 2010    PILONIDAL CYST DRAINAGE  2006    incision and drainage of pilonidal cyst simple        Family History   Problem Relation Age of Onset    No Known Problems Mother     Heart attack Father     Coronary artery disease Father     Hypertension Father     Diabetes Maternal Grandmother     Heart disease Maternal Grandmother     Liver disease Maternal Grandfather         chronic     Hepatitis Family     Ulcers Family     Other Family         urinary incontinence     Varicose Veins Family         of lower extremities     Substance Abuse Neg Hx     Mental illness Neg Hx      I have reviewed and agree with the history as documented  Social History     Tobacco Use    Smoking status: Never Smoker    Smokeless tobacco: Never Used   Substance Use Topics    Alcohol use: Yes     Comment: rarely    Drug use: No     Comment: drug use active past usage per allscipt         Review of Systems   Constitutional: Positive for diaphoresis  Negative for appetite change, chills, fatigue and fever  HENT: Positive for sore throat, trouble swallowing and voice change  Negative for congestion and rhinorrhea  Respiratory: Negative for cough, shortness of breath, wheezing and stridor  Cardiovascular: Positive for palpitations  Negative for chest pain and leg swelling  Gastrointestinal: Negative for abdominal distention, abdominal pain, constipation, diarrhea, nausea and vomiting  Endocrine: Negative for polydipsia and polyuria     Genitourinary: Negative for dysuria and hematuria  Musculoskeletal: Negative for back pain, neck pain and neck stiffness  Skin: Negative for pallor, rash and wound  Neurological: Positive for light-headedness  Negative for dizziness and headaches  Psychiatric/Behavioral: Negative for behavioral problems and confusion  All other systems reviewed and are negative  Physical Exam  ED Triage Vitals [10/08/19 1222]   Temperature Pulse Respirations Blood Pressure SpO2   97 9 °F (36 6 °C) 89 16 162/67 100 %      Temp Source Heart Rate Source Patient Position - Orthostatic VS BP Location FiO2 (%)   Oral Monitor Sitting Left arm --      Pain Score       No Pain             Orthostatic Vital Signs  Vitals:    10/08/19 1222   BP: 162/67   Pulse: 89   Patient Position - Orthostatic VS: Sitting       Physical Exam   Constitutional: She is oriented to person, place, and time  She appears well-developed and well-nourished  No distress  HENT:   Head: Normocephalic and atraumatic  Mouth/Throat: Uvula is midline and mucous membranes are normal  Posterior oropharyngeal erythema present  No oropharyngeal exudate or posterior oropharyngeal edema  Tonsils are 0 on the right  Tonsils are 0 on the left  No tonsillar exudate  Eyes: Pupils are equal, round, and reactive to light  Conjunctivae and EOM are normal  No scleral icterus  Neck: Normal range of motion  Neck supple  No thyromegaly present  Cardiovascular: Normal rate, regular rhythm, normal heart sounds and intact distal pulses  Exam reveals no gallop and no friction rub  No murmur heard  Pulmonary/Chest: Effort normal  No stridor  No respiratory distress  She has no wheezes  She has no rales  She exhibits no tenderness  Abdominal: Soft  Bowel sounds are normal  She exhibits no distension and no mass  There is no tenderness  There is no rebound and no guarding  No hernia  Musculoskeletal: Normal range of motion  She exhibits no edema, tenderness or deformity     Lymphadenopathy:     She has no cervical adenopathy  Neurological: She is alert and oriented to person, place, and time  No cranial nerve deficit or sensory deficit  She exhibits normal muscle tone  Coordination normal    Skin: Skin is warm and dry  Capillary refill takes less than 2 seconds  No rash noted  She is not diaphoretic  No erythema  No pallor  Psychiatric: She has a normal mood and affect  Her behavior is normal    Nursing note and vitals reviewed        ED Medications  Medications   diphenhydrAMINE (BENADRYL) tablet 25 mg (25 mg Oral Given 10/8/19 1345)   predniSONE tablet 40 mg (40 mg Oral Given 10/8/19 1423)       Diagnostic Studies  Results Reviewed     None                 No orders to display         Procedures  ECG 12 Lead Documentation Only  Date/Time: 10/8/2019 3:17 PM  Performed by: Baylee Messer MD  Authorized by: Baylee Messer MD     Indications / Diagnosis:  SOB  ECG reviewed by me, the ED Provider: yes    Patient location:  ED  Previous ECG:     Previous ECG:  Unavailable    Comparison to cardiac monitor: No    Interpretation:     Interpretation: normal    Rate:     ECG rate:  84    ECG rate assessment: normal    Rhythm:     Rhythm: sinus rhythm    Ectopy:     Ectopy: none    QRS:     QRS axis:  Normal    QRS intervals:  Normal  ST segments:     ST segments:  Normal  T waves:     T waves: normal              ED Course         HEART Risk Score      Most Recent Value   History  0 Filed at: 10/09/2019 2111   ECG  0 Filed at: 10/09/2019 2111   Age  0 Filed at: 10/09/2019 2111   Risk Factors  1 Filed at: 10/09/2019 2111   Troponin     Heart Score Risk Calculator   History  0 Filed at: 10/09/2019 2111   ECG  0 Filed at: 10/09/2019 2111   Age  0 Filed at: 10/09/2019 2111   Risk Factors  1 Filed at: 10/09/2019 2111   Troponin     HEART Score     HEART Score                              MDM  Number of Diagnoses or Management Options  Allergic reaction: new and requires workup  Diagnosis management comments: Throat swelling, heoarse voice following exposures to medications  Offending agest unknown as started multiple new medications including zyrtec, dextromethorphan, guaifenesin, the pot with Mental   Patient has stable vital signs and benign exam with the exception of or pharyngeal erythema and hoarseness of voice  If 25 mg Benadryl  Will check EKG  Reassess  EKG with normal limits  Patient is improvement of sore throat and is now speaking in her normal weeks  Likely allergic reaction given improvement with antihistamine  Patient started to continue Benadryl q 8 hours p r n  Louie Wilson Also recommended that she stops her taking continue clear 10 as this is a possible etiology  Return precautions discussed  Patient provided with script for EpiPen instructed to go to the ER if she has similar symptoms the require use of her EpiPen  Amount and/or Complexity of Data Reviewed  Clinical lab tests: ordered and reviewed  Decide to obtain previous medical records or to obtain history from someone other than the patient: yes  Obtain history from someone other than the patient: yes  Review and summarize past medical records: yes  Discuss the patient with other providers: yes  Independent visualization of images, tracings, or specimens: yes    Risk of Complications, Morbidity, and/or Mortality  Presenting problems: low  Diagnostic procedures: low  Management options: low    Patient Progress  Patient progress: improved      Disposition  Final diagnoses: Allergic reaction     Time reflects when diagnosis was documented in both MDM as applicable and the Disposition within this note     Time User Action Codes Description Comment    10/8/2019  3:03 PM [address] Allergic reaction       ED Disposition     ED Disposition Condition Date/Time Comment    Discharge Stable Tue Oct 8, 2019  3:03 PM Ashok Nieves discharge to home/self care              Follow-up Information     Follow up With Specialties Details Why Contact Info    Mando Parnell PA-C Family Medicine, Physician Assistant   [facility], [address] [place] [address]  [phone]            Discharge Medication List as of 10/8/2019  3:22 PM      START taking these medications    Details   EPINEPHrine (EPIPEN) 0 3 mg/0 3 mL SOAJ Inject 0 3 mL (0 3 mg total) into a muscle once for 1 dose, Starting Tue 10/8/2019, Print      predniSONE 20 mg tablet Take 2 tablets (40 mg total) by mouth daily for 2 days, Starting Tue 10/8/2019, Until Thu 10/10/2019, Print         CONTINUE these medications which have NOT CHANGED    Details   azelastine (ASTELIN) 0 1 % nasal spray 1 spray into each nostril 2 (two) times a day Use in each nostril as directed, Starting Fri 10/4/2019, Normal      budesonide (RINOCORT AQUA) 32 MCG/ACT nasal spray 2 sprays into each nostril daily, Starting Fri 10/4/2019, Normal      loratadine (CLARITIN) 10 mg tablet Take 10 mg by mouth daily, Historical Med      valACYclovir (VALTREX) 1,000 mg tablet TAKE 2 TABLETS AT FIRST SIGN OF ERUPTION,THEN 2 TABLETS EVERY 12 HOURS for a total of 4 doses), Normal           No discharge procedures on file  ED Provider  Attending physically available and evaluated Hira Godfrey SALAZAR managed the patient along with the ED Attending      Electronically Signed by         Zhane Carrillo MD  10/12/19 4404

## 2019-10-08 NOTE — TELEPHONE ENCOUNTER
Received a call from this patient this AM that she had been in to see you for an issue with her ear  Now today while at work, feels like her throat is closing, ear closing and possible tongue swelling  When I asked her if she was having an allergic rxn to something, she said she wasn't sure  She said that she came home from work and took some cough medicine and now is having these symptoms  Asked patient if there was anyone home to take her to the ER and she stated no  I advised her to call 911 and she said she will

## 2019-10-10 ENCOUNTER — OFFICE VISIT (OUTPATIENT)
Dept: FAMILY MEDICINE CLINIC | Facility: CLINIC | Age: 44
End: 2019-10-10
Payer: COMMERCIAL

## 2019-10-10 VITALS
TEMPERATURE: 97.9 F | HEIGHT: 61 IN | DIASTOLIC BLOOD PRESSURE: 70 MMHG | WEIGHT: 123.5 LBS | HEART RATE: 80 BPM | RESPIRATION RATE: 14 BRPM | BODY MASS INDEX: 23.32 KG/M2 | SYSTOLIC BLOOD PRESSURE: 120 MMHG

## 2019-10-10 DIAGNOSIS — J30.9 ACUTE ALLERGIC RHINITIS: Primary | ICD-10-CM

## 2019-10-10 PROCEDURE — 99214 OFFICE O/P EST MOD 30 MIN: CPT | Performed by: PHYSICIAN ASSISTANT

## 2019-10-10 RX ORDER — PREDNISONE 10 MG/1
TABLET ORAL
Qty: 20 TABLET | Refills: 0 | Status: SHIPPED | OUTPATIENT
Start: 2019-10-10 | End: 2019-10-23 | Stop reason: ALTCHOICE

## 2019-10-10 NOTE — PROGRESS NOTES
Assessment/Plan:    1  Acute allergic rhinitis    - start prednisone taper as discussed, continue Claritin 10 mg once daily, if Claritin is not working try Allegra 180 mg once daily  - predniSONE 10 mg tablet; Take 4 tabs on day 1,2 with food, 3 tabs on day 3,4 with food, 2 tabs on day 5,6 with food, 1 tab on day 7,8 with food  Dispense: 20 tablet; Refill: 0    F/u as needed    Subjective:   Chief Complaint   Patient presents with    ER follow up      Patient ID: Shruthi Mueller is a 45 y o  female  Patient here in follow up  Was in the ER 10/8/2019 for an allergic reaction  Had taken Mucinex, Nasacort and Zyrtec on 10/8 for congestion, lump in throat  After taking these medications started to feel off, foggy, blurry vision, heart racing, blood draining from body  Called 911 and was taken to ER  Patient was given Benadryl and prednisone 40 mg with some relief  Was given an epi pen for the Mucinex  Today felt normal  With laying flat feels better but then with movement feels the congestion increase, left ear clogged and can't hear, post nasal drip  Mucus still clear  Denies fever, chills         The following portions of the patient's history were reviewed and updated as appropriate: allergies, current medications, past family history, past medical history, past social history, past surgical history and problem list     Past Medical History:   Diagnosis Date    ASCUS with positive high risk HPV cervical     resolved 12/11/17     Chlamydia     HPV (human papilloma virus) infection     Migraine     Urinary tract infection      Past Surgical History:   Procedure Laterality Date    CERVICAL BIOPSY  W/ LOOP ELECTRODE EXCISION      COLPOSCOPY W/ BIOPSY / CURETTAGE      coloposcopy cervix with biopsy with endocervical curettage 6/1/15 colpo chronic cervicitis     ENDOMETRIAL BIOPSY      without cervical dilation Jameel Marquez 2010    PILONIDAL CYST DRAINAGE  2006    incision and drainage of pilonidal cyst simple      Family History   Problem Relation Age of Onset    No Known Problems Mother     Heart attack Father     Coronary artery disease Father     Hypertension Father     Diabetes Maternal Grandmother     Heart disease Maternal Grandmother     Liver disease Maternal Grandfather         chronic     Hepatitis Family     Ulcers Family     Other Family         urinary incontinence     Varicose Veins Family         of lower extremities     Substance Abuse Neg Hx     Mental illness Neg Hx      Social History     Socioeconomic History    Marital status: /Civil Union     Spouse name: Not on file    Number of children: Not on file    Years of education: Not on file    Highest education level: Not on file   Occupational History    Not on file   Social Needs    Financial resource strain: Not on file    Food insecurity:     Worry: Not on file     Inability: Not on file    Transportation needs:     Medical: Not on file     Non-medical: Not on file   Tobacco Use    Smoking status: Never Smoker    Smokeless tobacco: Never Used   Substance and Sexual Activity    Alcohol use: Yes     Comment: rarely    Drug use: No     Comment: drug use active past usage per allscipt     Sexual activity: Yes     Birth control/protection: IUD     Comment: presence of  intrauterine contraceptive device                        Lifestyle    Physical activity:     Days per week: Not on file     Minutes per session: Not on file    Stress: Not on file   Relationships    Social connections:     Talks on phone: Not on file     Gets together: Not on file     Attends [employer] service: Not on file     Active member of club or organization: Not on file     Attends meetings of clubs or organizations: Not on file     Relationship status: Not on file    Intimate partner violence:     Fear of current or ex partner: Not on file     Emotionally abused: Not on file     Physically abused: Not on file     Forced sexual activity: Not on file   Other Topics Concern    Not on file   Social History Narrative    Daily coffee consumption 6 cups day    Uses safety equipment seatbelts       Current Outpatient Medications:     azelastine (ASTELIN) 0 1 % nasal spray, 1 spray into each nostril 2 (two) times a day Use in each nostril as directed, Disp: 1 Bottle, Rfl: 0    budesonide (RINOCORT AQUA) 32 MCG/ACT nasal spray, 2 sprays into each nostril daily, Disp: 1 Bottle, Rfl: 1    loratadine (CLARITIN) 10 mg tablet, Take 10 mg by mouth daily, Disp: , Rfl:     EPINEPHrine (EPIPEN) 0 3 mg/0 3 mL SOAJ, Inject 0 3 mL (0 3 mg total) into a muscle once for 1 dose, Disp: 0 6 mL, Rfl: 0    valACYclovir (VALTREX) 1,000 mg tablet, TAKE 2 TABLETS AT FIRST SIGN OF ERUPTION,THEN 2 TABLETS EVERY 12 HOURS for a total of 4 doses), Disp: 8 tablet, Rfl: 6    Review of Systems          Objective:    Vitals:    10/10/19 1134   BP: 120/70   BP Location: Left arm   Patient Position: Sitting   Cuff Size: Standard   Pulse: 80   Resp: 14   Temp: 97 9 °F (36 6 °C)   TempSrc: Oral   Weight: 56 kg (123 lb 8 oz)   Height: 5' 1 25" (1 556 m)        Physical Exam      Constitutional:is oriented to person, place, and time  She appears well-developed and well-nourished  HENT:   Head: Normocephalic and atraumatic  Right Ear: Tympanic membrane, external ear and ear canal normal    Left Ear: Tympanic membrane, external ear and ear canal normal    Mouth/Throat: Oropharynx is clear and moist    Turbinates pink and boggy with congestion, clear mucus   Cardiovascular: Normal rate, regular rhythm and normal heart sounds  Pulmonary/Chest: Effort normal and breath sounds normal    Lymphadenopathy:    no cervical adenopathy  Neurological:  is alert and oriented to person, place, and time  Skin: Skin is warm  Psych: has a normal mood and affect

## 2019-10-12 ENCOUNTER — TELEPHONE (OUTPATIENT)
Dept: FAMILY MEDICINE CLINIC | Facility: CLINIC | Age: 44
End: 2019-10-12

## 2019-10-12 NOTE — TELEPHONE ENCOUNTER
Was in to see Sean Ricks 2 days ago and was put on Prednisone  Last night @ 1 AM started with stomach cramping and then diarrhea  Has gone about 5 times since then  Not a lot, but some mucuosy diarrhea with blood streaks  Is concerned about the blood in it  Said she would take Immodium but didn't know if she should because Sean Ricks told her to only take the Prednisone and allergy medication  Gets pain in her stomach right before she has to go to the bathroom

## 2019-10-12 NOTE — TELEPHONE ENCOUNTER
Tell her to stop the prednisone taper should be fine  To take Tums 1 with each meal for the next 3 days to have heal her stomach  Continue with the Allegra for the allergies

## 2019-10-23 ENCOUNTER — OFFICE VISIT (OUTPATIENT)
Dept: FAMILY MEDICINE CLINIC | Facility: CLINIC | Age: 44
End: 2019-10-23
Payer: COMMERCIAL

## 2019-10-23 VITALS
HEIGHT: 61 IN | SYSTOLIC BLOOD PRESSURE: 112 MMHG | BODY MASS INDEX: 24.26 KG/M2 | WEIGHT: 128.5 LBS | RESPIRATION RATE: 16 BRPM | HEART RATE: 80 BPM | DIASTOLIC BLOOD PRESSURE: 74 MMHG

## 2019-10-23 DIAGNOSIS — H90.5 SENSORINEURAL HEARING LOSS (SNHL) OF RIGHT EAR, UNSPECIFIED HEARING STATUS ON CONTRALATERAL SIDE: ICD-10-CM

## 2019-10-23 DIAGNOSIS — R53.83 FATIGUE, UNSPECIFIED TYPE: Primary | ICD-10-CM

## 2019-10-23 DIAGNOSIS — H91.21 SUDDEN HEARING LOSS, RIGHT: ICD-10-CM

## 2019-10-23 PROCEDURE — 99213 OFFICE O/P EST LOW 20 MIN: CPT | Performed by: PHYSICIAN ASSISTANT

## 2019-10-23 PROCEDURE — 3008F BODY MASS INDEX DOCD: CPT | Performed by: PHYSICIAN ASSISTANT

## 2019-10-23 NOTE — PROGRESS NOTES
Assessment/Plan:    1  Fatigue, unspecified type    - will check labs to start  - EBV acute panel; Future  - Lyme Antibody Profile with reflex to WB  - CBC and differential  - Comprehensive metabolic panel  - Vitamin B12  - TSH, 3rd generation with Free T4 reflex    2  Sudden hearing loss, right    - will check labs, patient under care Dr Henrik Sanchez, had first steroid injection last week, due for #2 next Monday  - EBV acute panel; Future  - Lyme Antibody Profile with reflex to WB  - CBC and differential  - Comprehensive metabolic panel  - Vitamin B12  - TSH, 3rd generation with Free T4 reflex    3  Sensorineural hearing loss (SNHL) of right ear, unspecified hearing status on contralateral side    - as above  - Lyme Antibody Profile with reflex to WB; Future  - CBC and differential; Future  - Comprehensive metabolic panel; Future  - Vitamin B12; Future  - TSH, 3rd generation with Free T4 reflex; Future    F/u pending labs  F/u as needed    Subjective:   Chief Complaint   Patient presents with    Fatigue      Patient ID: Reta Stack is a 45 y o  female  Patient here complaining of fatigue recently in the past week  To the point she could fall asleep during the day  Cannot explain it just "does not feel herself"  Coupled with she was diagnosised with idiopathic sensorineural hearing loss in the past month patient concerned something bigger is wrong  Requesting labs  Hearing loss in right ear  Had injection in dexamethasone in right ear on Monday by Dr Henrik Sanchez  He requested an MRA of IAC but patient holding on this for now        The following portions of the patient's history were reviewed and updated as appropriate: allergies, current medications, past family history, past medical history, past social history, past surgical history and problem list     Past Medical History:   Diagnosis Date    ASCUS with positive high risk HPV cervical     resolved 12/11/17     Chlamydia     HPV (human papilloma virus) infection     Migraine     Urinary tract infection      Past Surgical History:   Procedure Laterality Date    CERVICAL BIOPSY  W/ LOOP ELECTRODE EXCISION      COLPOSCOPY W/ BIOPSY / CURETTAGE      coloposcopy cervix with biopsy with endocervical curettage 6/1/15 colpo chronic cervicitis     ENDOMETRIAL BIOPSY      without cervical dilation Wiley Kelley 2010    PILONIDAL CYST DRAINAGE  2006    incision and drainage of pilonidal cyst simple      Family History   Problem Relation Age of Onset    No Known Problems Mother     Heart attack Father     Coronary artery disease Father     Hypertension Father     Diabetes Maternal Grandmother     Heart disease Maternal Grandmother     Liver disease Maternal Grandfather         chronic     Hepatitis Family     Ulcers Family     Other Family         urinary incontinence     Varicose Veins Family         of lower extremities     Substance Abuse Neg Hx     Mental illness Neg Hx      Social History     Socioeconomic History    Marital status: /Civil Union     Spouse name: Not on file    Number of children: Not on file    Years of education: Not on file    Highest education level: Not on file   Occupational History    Not on file   Social Needs    Financial resource strain: Not on file    Food insecurity:     Worry: Not on file     Inability: Not on file    Transportation needs:     Medical: Not on file     Non-medical: Not on file   Tobacco Use    Smoking status: Former Smoker     Packs/day: 0 50     Years: 5 00     Pack years: 2 50    Smokeless tobacco: Never Used   Substance and Sexual Activity    Alcohol use: Yes     Frequency: Monthly or less     Comment: rarely    Drug use: No     Comment: drug use active past usage per allscipt     Sexual activity: Yes     Birth control/protection: IUD     Comment: presence of  intrauterine contraceptive device                        Lifestyle    Physical activity:     Days per week: Not on file Minutes per session: Not on file    Stress: Not on file   Relationships    Social connections:     Talks on phone: Not on file     Gets together: Not on file     Attends Faith service: Not on file     Active member of club or organization: Not on file     Attends meetings of clubs or organizations: Not on file     Relationship status: Not on file    Intimate partner violence:     Fear of current or ex partner: Not on file     Emotionally abused: Not on file     Physically abused: Not on file     Forced sexual activity: Not on file   Other Topics Concern    Not on file   Social History Narrative    Daily coffee consumption 6 cups day    Uses safety equipment seatbelts       Current Outpatient Medications:     azelastine (ASTELIN) 0 1 % nasal spray, 1 spray into each nostril 2 (two) times a day Use in each nostril as directed, Disp: 1 Bottle, Rfl: 0    budesonide (RINOCORT AQUA) 32 MCG/ACT nasal spray, 2 sprays into each nostril daily, Disp: 1 Bottle, Rfl: 1    loratadine (CLARITIN) 10 mg tablet, Take 10 mg by mouth daily, Disp: , Rfl:     EPINEPHrine (EPIPEN) 0 3 mg/0 3 mL SOAJ, Inject 0 3 mL (0 3 mg total) into a muscle once for 1 dose, Disp: 0 6 mL, Rfl: 0    valACYclovir (VALTREX) 1,000 mg tablet, TAKE 2 TABLETS AT FIRST SIGN OF ERUPTION,THEN 2 TABLETS EVERY 12 HOURS for a total of 4 doses), Disp: 8 tablet, Rfl: 6    Review of Systems          Objective:    Vitals:    10/23/19 1442   BP: 112/74   BP Location: Left arm   Patient Position: Sitting   Cuff Size: Standard   Pulse: 80   Resp: 16   Weight: 58 3 kg (128 lb 8 oz)   Height: 5' 1" (1 549 m)        Physical Exam   Constitutional: She is oriented to person, place, and time  She appears well-developed and well-nourished  Cardiovascular: Normal rate, regular rhythm and normal heart sounds  Pulmonary/Chest: Effort normal and breath sounds normal    Neurological: She is alert and oriented to person, place, and time  Skin: Skin is warm  Psychiatric: She has a normal mood and affect

## 2019-10-24 LAB
ALBUMIN SERPL-MCNC: 4.7 G/DL (ref 3.6–5.1)
ALBUMIN/GLOB SERPL: 1.7 (CALC) (ref 1–2.5)
ALP SERPL-CCNC: 40 U/L (ref 33–115)
ALT SERPL-CCNC: 12 U/L (ref 6–29)
AST SERPL-CCNC: 14 U/L (ref 10–30)
B BURGDOR AB SER IA-ACNC: <0.9 INDEX
BASOPHILS # BLD AUTO: 116 CELLS/UL (ref 0–200)
BASOPHILS NFR BLD AUTO: 0.9 %
BILIRUB SERPL-MCNC: 0.3 MG/DL (ref 0.2–1.2)
BUN SERPL-MCNC: 7 MG/DL (ref 7–25)
BUN/CREAT SERPL: NORMAL (CALC) (ref 6–22)
CALCIUM SERPL-MCNC: 9.4 MG/DL (ref 8.6–10.2)
CHLORIDE SERPL-SCNC: 102 MMOL/L (ref 98–110)
CO2 SERPL-SCNC: 26 MMOL/L (ref 20–32)
CREAT SERPL-MCNC: 0.8 MG/DL (ref 0.5–1.1)
EBV NA IGG SER IA-ACNC: 104 U/ML
EBV VCA IGG SER IA-ACNC: 129 U/ML
EBV VCA IGM SER IA-ACNC: <36 U/ML
EOSINOPHIL # BLD AUTO: 206 CELLS/UL (ref 15–500)
EOSINOPHIL NFR BLD AUTO: 1.6 %
ERYTHROCYTE [DISTWIDTH] IN BLOOD BY AUTOMATED COUNT: 12.5 % (ref 11–15)
GLOBULIN SER CALC-MCNC: 2.8 G/DL (CALC) (ref 1.9–3.7)
GLUCOSE SERPL-MCNC: 76 MG/DL (ref 65–139)
HCT VFR BLD AUTO: 44 % (ref 35–45)
HGB BLD-MCNC: 14.7 G/DL (ref 11.7–15.5)
LYMPHOCYTES # BLD AUTO: 2967 CELLS/UL (ref 850–3900)
LYMPHOCYTES NFR BLD AUTO: 23 %
MCH RBC QN AUTO: 29.9 PG (ref 27–33)
MCHC RBC AUTO-ENTMCNC: 33.4 G/DL (ref 32–36)
MCV RBC AUTO: 89.6 FL (ref 80–100)
MONOCYTES # BLD AUTO: 645 CELLS/UL (ref 200–950)
MONOCYTES NFR BLD AUTO: 5 %
NEUTROPHILS # BLD AUTO: 8966 CELLS/UL (ref 1500–7800)
NEUTROPHILS NFR BLD AUTO: 69.5 %
PLATELET # BLD AUTO: 326 THOUSAND/UL (ref 140–400)
PMV BLD REES-ECKER: 9.8 FL (ref 7.5–12.5)
POTASSIUM SERPL-SCNC: 3.6 MMOL/L (ref 3.5–5.3)
PROT SERPL-MCNC: 7.5 G/DL (ref 6.1–8.1)
RBC # BLD AUTO: 4.91 MILLION/UL (ref 3.8–5.1)
SL AMB EGFR AFRICAN AMERICAN: 105 ML/MIN/1.73M2
SL AMB EGFR NON AFRICAN AMERICAN: 91 ML/MIN/1.73M2
SODIUM SERPL-SCNC: 137 MMOL/L (ref 135–146)
TSH SERPL-ACNC: 1.66 MIU/L
VIT B12 SERPL-MCNC: 744 PG/ML (ref 200–1100)
WBC # BLD AUTO: 12.9 THOUSAND/UL (ref 3.8–10.8)

## 2019-10-28 ENCOUNTER — TELEPHONE (OUTPATIENT)
Dept: FAMILY MEDICINE CLINIC | Facility: CLINIC | Age: 44
End: 2019-10-28

## 2019-10-28 NOTE — TELEPHONE ENCOUNTER
Please let her know her labs were all normal  WBC slightly elevated due to steroid injection nothing to be concerned about and shows an OLD mono infection nothing acute

## 2019-10-28 NOTE — TELEPHONE ENCOUNTER
Patient would like a call on her recent labs that were done   She WILL NOT  Be available to talk to you between 11 to 12:30, as she will be at a Dr 's visit for her hearing      [phone]

## 2019-10-30 ENCOUNTER — HOSPITAL ENCOUNTER (OUTPATIENT)
Dept: MRI IMAGING | Facility: HOSPITAL | Age: 44
Discharge: HOME | End: 2019-10-30
Payer: COMMERCIAL

## 2019-10-30 DIAGNOSIS — H91.21 SUDDEN HEARING LOSS, RIGHT: ICD-10-CM

## 2019-10-30 PROCEDURE — A9585 GADOBUTROL INJECTION: HCPCS | Performed by: RADIOLOGY

## 2019-10-30 PROCEDURE — 70553 MRI BRAIN STEM W/O & W/DYE: CPT

## 2019-10-30 RX ADMIN — GADOBUTROL 5 ML: 604.72 INJECTION INTRAVENOUS at 20:12

## 2020-03-13 ENCOUNTER — OFFICE VISIT (OUTPATIENT)
Dept: FAMILY MEDICINE CLINIC | Facility: CLINIC | Age: 45
End: 2020-03-13
Payer: COMMERCIAL

## 2020-03-13 VITALS
RESPIRATION RATE: 14 BRPM | HEIGHT: 61 IN | SYSTOLIC BLOOD PRESSURE: 110 MMHG | WEIGHT: 132.4 LBS | TEMPERATURE: 97.8 F | DIASTOLIC BLOOD PRESSURE: 70 MMHG | HEART RATE: 80 BPM | BODY MASS INDEX: 25 KG/M2

## 2020-03-13 DIAGNOSIS — Z12.39 SCREENING FOR MALIGNANT NEOPLASM OF BREAST: ICD-10-CM

## 2020-03-13 DIAGNOSIS — R09.81 SINUS CONGESTION: Primary | ICD-10-CM

## 2020-03-13 PROCEDURE — 99213 OFFICE O/P EST LOW 20 MIN: CPT | Performed by: FAMILY MEDICINE

## 2020-03-13 PROCEDURE — 3008F BODY MASS INDEX DOCD: CPT | Performed by: FAMILY MEDICINE

## 2020-03-13 PROCEDURE — 1036F TOBACCO NON-USER: CPT | Performed by: FAMILY MEDICINE

## 2020-03-13 RX ORDER — PREDNISONE 10 MG/1
TABLET ORAL
Qty: 18 TABLET | Refills: 0 | Status: SHIPPED | OUTPATIENT
Start: 2020-03-13 | End: 2021-05-21 | Stop reason: ALTCHOICE

## 2020-03-13 NOTE — PROGRESS NOTES
Assessment/Plan:    1  Sinus congestion  - start Prednisone taper, continue Claritin and use saline nasal rinse, follow up if not better  - predniSONE 10 mg tablet; Take 3 tablets daily with food for 3 days, 2 tablets daily for 3 days, 1 tablet daily for 3 days  Dispense: 18 tablet; Refill: 0  - sodium chloride (OCEAN) 0 65 % nasal spray; 2 sprays into each nostril 3 (three) times a day  Dispense: 15 mL    2  Screening for malignant neoplasm of breast  - Mammo screening bilateral w cad; Future         The treatment plan and possible side effects of new medications were reviewed with the patient today  The patient understands and agrees with the treatment plan  Subjective:   Chief Complaint   Patient presents with    Headache     Times 3 days    Nasal Congestion      Patient ID: Andry Jeter is a 39 y o  female who presents today with c/o nasal congestion, postnasal drip, mild non productive cough and sinus pressure for the past 3-4 days, she is now having frontal headache,  no fever or chills, no wheezing or SOB, no pleuritic chest pain, no recent travel  Patient started taking Claritin daily with slight relief of her symptoms           The following portions of the patient's history were reviewed and updated as appropriate: allergies, current medications, past family history, past medical history, past social history, past surgical history and problem list     Past Medical History:   Diagnosis Date    ASCUS with positive high risk HPV cervical     resolved 12/11/17     Chlamydia     HPV (human papilloma virus) infection     Migraine     Urinary tract infection      Past Surgical History:   Procedure Laterality Date    CERVICAL BIOPSY  W/ LOOP ELECTRODE EXCISION      COLPOSCOPY W/ BIOPSY / CURETTAGE      coloposcopy cervix with biopsy with endocervical curettage 6/1/15 colpo chronic cervicitis     ENDOMETRIAL BIOPSY      without cervical dilation nAgus Mast 2010    PILONIDAL CYST DRAINAGE  2006 incision and drainage of pilonidal cyst simple      Family History   Problem Relation Age of Onset    No Known Problems Mother     Heart attack Father     Coronary artery disease Father     Hypertension Father     Diabetes Maternal Grandmother     Heart disease Maternal Grandmother     Liver disease Maternal Grandfather         chronic     Hepatitis Family     Ulcers Family     Other Family         urinary incontinence     Varicose Veins Family         of lower extremities     Substance Abuse Neg Hx     Mental illness Neg Hx      Social History     Socioeconomic History    Marital status: /Civil Union     Spouse name: Not on file    Number of children: Not on file    Years of education: Not on file    Highest education level: Not on file   Occupational History    Not on file   Social Needs    Financial resource strain: Not on file    Food insecurity:     Worry: Not on file     Inability: Not on file    Transportation needs:     Medical: Not on file     Non-medical: Not on file   Tobacco Use    Smoking status: Former Smoker     Packs/day: 0 50     Years: 5 00     Pack years: 2 50    Smokeless tobacco: Never Used   Substance and Sexual Activity    Alcohol use: Yes     Frequency: Monthly or less     Comment: rarely    Drug use: No     Comment: drug use active past usage per allscipt     Sexual activity: Yes     Birth control/protection: IUD     Comment: presence of  intrauterine contraceptive device                        Lifestyle    Physical activity:     Days per week: Not on file     Minutes per session: Not on file    Stress: Not on file   Relationships    Social connections:     Talks on phone: Not on file     Gets together: Not on file     Attends Zoroastrian service: Not on file     Active member of club or organization: Not on file     Attends meetings of clubs or organizations: Not on file     Relationship status: Not on file    Intimate partner violence:     Fear of current or ex partner: Not on file     Emotionally abused: Not on file     Physically abused: Not on file     Forced sexual activity: Not on file   Other Topics Concern    Not on file   Social History Narrative    Daily coffee consumption 6 cups day    Uses safety equipment seatbelts       Current Outpatient Medications:     EPINEPHrine (EPIPEN) 0 3 mg/0 3 mL SOAJ, Inject 0 3 mL (0 3 mg total) into a muscle once for 1 dose, Disp: 0 6 mL, Rfl: 0    loratadine (CLARITIN) 10 mg tablet, Take 10 mg by mouth daily, Disp: , Rfl:     valACYclovir (VALTREX) 1,000 mg tablet, TAKE 2 TABLETS AT FIRST SIGN OF ERUPTION,THEN 2 TABLETS EVERY 12 HOURS for a total of 4 doses), Disp: 8 tablet, Rfl: 6    Review of Systems   Constitutional: Positive for fatigue  Negative for chills and fever  HENT: Positive for congestion and rhinorrhea  Negative for ear pain, sore throat, trouble swallowing and voice change  Respiratory: Positive for cough  Negative for shortness of breath and wheezing  Cardiovascular: Negative for chest pain and palpitations  Gastrointestinal: Negative for abdominal pain, diarrhea, nausea and vomiting  Neurological: Positive for headaches  Negative for dizziness  Hematological: Negative for adenopathy  Objective:    Vitals:    03/13/20 1454   BP: 110/70   BP Location: Left arm   Patient Position: Sitting   Cuff Size: Standard   Pulse: 80   Resp: 14   Temp: 97 8 °F (36 6 °C)   Weight: 60 1 kg (132 lb 6 4 oz)   Height: 5' 1" (1 549 m)        Physical Exam   Constitutional: She is oriented to person, place, and time  She appears well-developed and well-nourished  No distress  HENT:   Head: Normocephalic and atraumatic  Right Ear: Tympanic membrane and ear canal normal    Left Ear: Tympanic membrane and ear canal normal    Nose: Mucosal edema present  Mouth/Throat: No oropharyngeal exudate or posterior oropharyngeal erythema  Neck: Neck supple     Cardiovascular: Normal rate, regular rhythm and normal heart sounds  No murmur heard  Pulmonary/Chest: Effort normal and breath sounds normal  No respiratory distress  She has no wheezes  She has no rhonchi  She has no rales  Lymphadenopathy:     She has no cervical adenopathy  Neurological: She is alert and oriented to person, place, and time  BMI Counseling: Body mass index is 25 02 kg/m²  The BMI is above normal  Nutrition recommendations include reducing portion sizes and consuming healthier snacks  Exercise recommendations include exercising 3-5 times per week

## 2020-03-23 ENCOUNTER — TELEPHONE (OUTPATIENT)
Dept: OBGYN CLINIC | Facility: CLINIC | Age: 45
End: 2020-03-23

## 2020-03-23 NOTE — TELEPHONE ENCOUNTER
Pt would like valtrex 1 mg refilled, last annual 02/2019  No future appt scheduled  Ph[facility] on file   Please advise

## 2020-03-23 NOTE — TELEPHONE ENCOUNTER
I can refill it, but please make her a yearly for a few months from now  Valtrex 1g two po at onset of symptoms, repeat in 12 hours    #4x2

## 2020-03-23 NOTE — TELEPHONE ENCOUNTER
----- Message from Janae Hair sent at 3/22/2020  4:07 PM EDT -----  Regarding: Prescription Question  Contact: [phone]  Bernadette Moya,    I am hoping all is well with you and the team   I am wondering if there is a way you can provide an RX for this prescription? I used it once from the time it was filled, and I noticed the possibility to refill has   With all the stress, I'm afraid I might break out   better to be ready       Thank you,  Mely Corado

## 2020-04-02 ENCOUNTER — AMB VIDEO VISIT (OUTPATIENT)
Dept: URGENT CARE | Facility: MEDICAL CENTER | Age: 45
End: 2020-04-02

## 2020-04-02 ENCOUNTER — TELEPHONE (OUTPATIENT)
Dept: URGENT CARE | Facility: MEDICAL CENTER | Age: 45
End: 2020-04-02

## 2020-04-02 ENCOUNTER — AMB VIDEO VISIT (OUTPATIENT)
Dept: OTHER | Facility: HOSPITAL | Age: 45
End: 2020-04-02
Payer: COMMERCIAL

## 2020-04-02 DIAGNOSIS — J30.9 ALLERGIC RHINITIS, UNSPECIFIED SEASONALITY, UNSPECIFIED TRIGGER: Primary | ICD-10-CM

## 2020-04-02 DIAGNOSIS — Z20.822 EXPOSURE TO COVID-19 VIRUS: ICD-10-CM

## 2020-04-02 PROCEDURE — 87635 SARS-COV-2 COVID-19 AMP PRB: CPT

## 2020-04-02 PROCEDURE — 99201 PR OFFICE OUTPATIENT NEW 10 MINUTES: CPT | Performed by: FAMILY MEDICINE

## 2020-04-05 LAB — SARS-COV-2 RNA SPEC QL NAA+PROBE: NOT DETECTED

## 2020-04-06 ENCOUNTER — TELEPHONE (OUTPATIENT)
Dept: URGENT CARE | Facility: MEDICAL CENTER | Age: 45
End: 2020-04-06

## 2020-12-03 ENCOUNTER — TELEPHONE (OUTPATIENT)
Dept: OBGYN CLINIC | Facility: CLINIC | Age: 45
End: 2020-12-03

## 2020-12-03 ENCOUNTER — ANNUAL EXAM (OUTPATIENT)
Dept: OBGYN CLINIC | Facility: CLINIC | Age: 45
End: 2020-12-03
Payer: COMMERCIAL

## 2020-12-03 VITALS
WEIGHT: 139 LBS | DIASTOLIC BLOOD PRESSURE: 58 MMHG | HEIGHT: 61 IN | BODY MASS INDEX: 26.24 KG/M2 | SYSTOLIC BLOOD PRESSURE: 102 MMHG

## 2020-12-03 DIAGNOSIS — Z01.419 ENCOUNTER FOR GYNECOLOGICAL EXAMINATION (GENERAL) (ROUTINE) WITHOUT ABNORMAL FINDINGS: Primary | ICD-10-CM

## 2020-12-03 DIAGNOSIS — Z97.5 IUD CONTRACEPTION: ICD-10-CM

## 2020-12-03 DIAGNOSIS — Z12.31 ENCOUNTER FOR SCREENING MAMMOGRAM FOR MALIGNANT NEOPLASM OF BREAST: ICD-10-CM

## 2020-12-03 DIAGNOSIS — N39.3 STRESS BLADDER INCONTINENCE, FEMALE: ICD-10-CM

## 2020-12-03 PROCEDURE — S0612 ANNUAL GYNECOLOGICAL EXAMINA: HCPCS | Performed by: OBSTETRICS & GYNECOLOGY

## 2020-12-03 RX ORDER — CETIRIZINE HYDROCHLORIDE 10 MG/1
10 TABLET ORAL DAILY
COMMUNITY

## 2020-12-21 ENCOUNTER — TELEPHONE (OUTPATIENT)
Dept: OBGYN CLINIC | Facility: HOSPITAL | Age: 45
End: 2020-12-21

## 2021-01-08 ENCOUNTER — PROCEDURE VISIT (OUTPATIENT)
Dept: OBGYN CLINIC | Facility: CLINIC | Age: 46
End: 2021-01-08
Payer: COMMERCIAL

## 2021-01-08 VITALS — BODY MASS INDEX: 27.13 KG/M2 | SYSTOLIC BLOOD PRESSURE: 124 MMHG | DIASTOLIC BLOOD PRESSURE: 82 MMHG | WEIGHT: 143.6 LBS

## 2021-01-08 DIAGNOSIS — Z30.433 ENCOUNTER FOR IUD REMOVAL AND REINSERTION: Primary | ICD-10-CM

## 2021-01-08 PROCEDURE — 58301 REMOVE INTRAUTERINE DEVICE: CPT | Performed by: NURSE PRACTITIONER

## 2021-01-08 PROCEDURE — 58300 INSERT INTRAUTERINE DEVICE: CPT | Performed by: NURSE PRACTITIONER

## 2021-01-08 NOTE — PROGRESS NOTES
Iud insertions    Date/Time: 1/8/2021 8:05 AM  Performed by: ROBINSON Prabhakar  Authorized by: ROBINSON Prabhakar   Universal Protocol:  Consent: Verbal consent obtained  Written consent obtained  Risks and benefits: risks, benefits and alternatives were discussed  Consent given by: patient  Patient understanding: patient states understanding of the procedure being performed  Patient consent: the patient's understanding of the procedure matches consent given        Procedure:     Pelvic exam performed: yes      Negative GC/chlamydia test: no      Negative urine pregnancy test: no      Cervix cleaned and prepped: yes      Speculum placed in vagina: yes      Tenaculum applied to cervix: no      Uterus sounded: yes      Uterus sound depth (cm):  7 5    IUD inserted with no complications: Mirena initially inserted without difficulty  Before removing speculum, Mirena started to expel on its own  There were no other extra Mirena's in office  Jettie Lee was available  Carlen Blue was reviewed prior to insertion and patient agreed to insertion  IUD type: Carlen Blue  Strings trimmed: yes    Post-procedure:     Patient tolerated procedure well: yes    Comments: This patient presents for Mirena IUD insertion  The risks/benefits, SE's/AE's were reviewed and all questions were answered  Written and verbal consent were obtained by myself  Time out was performed and allergies were confirmed  The patient was positioned in lithotomy position and speculum was inserted  Cervix was visualized and cleansed with betadine  Uterus sounded to 7 5cm  The IUD was inserted without difficulty and the strings were trimmed  Prior to removing speculum, the Mirena IUD started to expel on its own  An extra Mirena was not available in the office, but a Carlen Blue IUD was  The patient was counseled on a Carlen Blue IUD and she was agreeable to Carlen Blue insertion   Carlen Blue IUD was inserted without difficulty, strings were trimmed, and speculum was removed  Hemostasis was observed and all instruments were removed  The patient tolerated well  Reviewed reasons to call and sx to report including excessive bleeding, pain unrelieved by ibuprofen or symptoms of infection such as fever, chills or foul smelling discharge  Recommended returning to the office in 4 weeks for IUD string check and utilizing condoms as back up until that time  The patient agrees to the plan

## 2021-01-08 NOTE — PROGRESS NOTES
Iud removal    Date/Time: 2021 8:00 AM  Performed by: ROBINSON He  Authorized by: ROBINSON He   Universal Protocol:  Consent: Verbal consent obtained  Written consent obtained  Risks and benefits: risks, benefits and alternatives were discussed  Consent given by: patient  Patient understanding: patient states understanding of the procedure being performed  Procedure consent: procedure consent matches procedure scheduled  Relevant documents: relevant documents present and verified      Procedure:     Removed with no complications: yes      Other reason for removal:   2010  Comments: The patient presents for Paragard IUD removal  We discussed risks/benefits, SE's/AE's of the procedure  All questions were answered and consent was obtained  Allergies were confirmed and time out was performed  The patient was positioned in lithotomy and speculum was inserted  The IUD strings were visualized at the os and they were grasped with ring forceps  Gentle traction was applied and the device was removed without difficulty  The device was noted to be intact and this was discarded  Hemostasis was observed and all instruments were removed  The patient tolerated well

## 2021-02-03 ENCOUNTER — VBI (OUTPATIENT)
Dept: ADMINISTRATIVE | Facility: OTHER | Age: 46
End: 2021-02-03

## 2021-02-04 ENCOUNTER — OFFICE VISIT (OUTPATIENT)
Dept: OBGYN CLINIC | Facility: CLINIC | Age: 46
End: 2021-02-04
Payer: COMMERCIAL

## 2021-02-04 VITALS — WEIGHT: 139.4 LBS | BODY MASS INDEX: 26.34 KG/M2 | DIASTOLIC BLOOD PRESSURE: 62 MMHG | SYSTOLIC BLOOD PRESSURE: 102 MMHG

## 2021-02-04 DIAGNOSIS — Z30.431 IUD CHECK UP: Primary | ICD-10-CM

## 2021-02-04 PROCEDURE — 1036F TOBACCO NON-USER: CPT | Performed by: OBSTETRICS & GYNECOLOGY

## 2021-02-04 PROCEDURE — 99213 OFFICE O/P EST LOW 20 MIN: CPT | Performed by: OBSTETRICS & GYNECOLOGY

## 2021-02-04 NOTE — PROGRESS NOTES
GYN Follow UP Visit    HPI:  Patient is here for IUD check up s/p Mirena reinsertion  Light VB  No cramping  No problems during IC  Has chafing due to pad use; aquaphor helps  PMH, PSH, Meds, Allergies, SocHx, FamHx reviewed and no changes noted  ROS  Pertinent findings in HPI    Vitals:    02/04/21 1110   BP: 102/62       Physical Exam  Constitutional:       Appearance: Normal appearance  Genitourinary:      Vagina normal       Vulval ulcerations present  No vaginal bleeding  No cervical bleeding  IUD strings visualized  HENT:      Head: Normocephalic  Cardiovascular:      Rate and Rhythm: Normal rate and regular rhythm  Pulmonary:      Effort: Pulmonary effort is normal    Abdominal:      Palpations: Abdomen is soft  Tenderness: There is no abdominal tenderness  Musculoskeletal:         General: No swelling  Neurological:      General: No focal deficit present  Mental Status: She is alert and oriented to person, place, and time  Skin:     General: Skin is warm and dry  Psychiatric:         Mood and Affect: Mood normal          Behavior: Behavior normal    Vitals signs reviewed  Impression:  Normal IUD check up  Vulvar friction dermatitis    Plan:  Discussed using zinc oxide-based creams  Follow up for annual or PRN sooner

## 2021-04-01 DIAGNOSIS — Z23 ENCOUNTER FOR IMMUNIZATION: ICD-10-CM

## 2021-05-21 ENCOUNTER — OFFICE VISIT (OUTPATIENT)
Dept: FAMILY MEDICINE CLINIC | Facility: CLINIC | Age: 46
End: 2021-05-21
Payer: COMMERCIAL

## 2021-05-21 VITALS
HEIGHT: 61 IN | WEIGHT: 143.2 LBS | DIASTOLIC BLOOD PRESSURE: 72 MMHG | SYSTOLIC BLOOD PRESSURE: 110 MMHG | BODY MASS INDEX: 27.03 KG/M2 | RESPIRATION RATE: 16 BRPM | HEART RATE: 70 BPM

## 2021-05-21 DIAGNOSIS — Z13.89 SCREENING FOR BLOOD OR PROTEIN IN URINE: ICD-10-CM

## 2021-05-21 DIAGNOSIS — J30.9 ALLERGIC RHINITIS, UNSPECIFIED SEASONALITY, UNSPECIFIED TRIGGER: ICD-10-CM

## 2021-05-21 DIAGNOSIS — Z13.29 SCREENING FOR THYROID DISORDER: ICD-10-CM

## 2021-05-21 DIAGNOSIS — Z13.220 SCREENING FOR CHOLESTEROL LEVEL: ICD-10-CM

## 2021-05-21 DIAGNOSIS — F41.9 ANXIETY: ICD-10-CM

## 2021-05-21 DIAGNOSIS — Z11.4 SCREENING FOR HIV (HUMAN IMMUNODEFICIENCY VIRUS): ICD-10-CM

## 2021-05-21 DIAGNOSIS — Z00.00 ANNUAL PHYSICAL EXAM: Primary | ICD-10-CM

## 2021-05-21 DIAGNOSIS — Z13.0 SCREENING FOR BLOOD DISEASE: ICD-10-CM

## 2021-05-21 DIAGNOSIS — Z13.228 SCREENING FOR METABOLIC DISORDER: ICD-10-CM

## 2021-05-21 DIAGNOSIS — Z13.1 SCREENING FOR DIABETES MELLITUS: ICD-10-CM

## 2021-05-21 PROCEDURE — 3725F SCREEN DEPRESSION PERFORMED: CPT | Performed by: FAMILY MEDICINE

## 2021-05-21 PROCEDURE — 3008F BODY MASS INDEX DOCD: CPT | Performed by: FAMILY MEDICINE

## 2021-05-21 PROCEDURE — 99396 PREV VISIT EST AGE 40-64: CPT | Performed by: FAMILY MEDICINE

## 2021-05-21 RX ORDER — ALPRAZOLAM 0.25 MG/1
0.25 TABLET ORAL 2 TIMES DAILY PRN
Qty: 15 TABLET | Refills: 0 | Status: SHIPPED | OUTPATIENT
Start: 2021-05-21

## 2021-05-21 RX ORDER — MONTELUKAST SODIUM 10 MG/1
10 TABLET ORAL
Qty: 30 TABLET | Refills: 3 | Status: SHIPPED | OUTPATIENT
Start: 2021-05-21 | End: 2022-07-27 | Stop reason: SDUPTHER

## 2021-05-21 NOTE — PATIENT INSTRUCTIONS

## 2021-05-21 NOTE — PROGRESS NOTES
ADULT ANNUAL PHYSICAL  [facility]    NAME: Antoine Valladares  AGE: 42 y o  SEX: female  : 1975     DATE: 2021     Assessment and Plan:     1  Annual physical exam  - Lipid Panel with Direct LDL reflex; Future  - Comprehensive metabolic panel; Future  - UA (URINE) with reflex to Scope; Future  - TSH, 3rd generation with Free T4 reflex; Future  - HIV 1/2 Antigen/Antibody (4th Generation) w Reflex [facility]; Future  - CBC and differential; Future    2  Allergic rhinitis  - advised to continue Zyrtec or Xyzal in am and start Singulair at bedtime, use saline nasal spray twice a day and follow up if not better   - montelukast (SINGULAIR) 10 mg tablet; Take 1 tablet (10 mg total) by mouth daily at bedtime  Dispense: 30 tablet; Refill: 3    3  Anxiety  - discussed stress reduction: regular exercise, meditation  Yoga, etc, use Xanax as needed, discussed SSRI's and counseling if symptoms persist or worsen  - ALPRAZolam (XANAX) 0 25 mg tablet; Take 1 tablet (0 25 mg total) by mouth 2 (two) times a day as needed for anxiety  Dispense: 15 tablet; Refill: 0    Immunizations and preventive care screenings were discussed with patient today  Appropriate education was printed on patient's after visit summary  Counseling:  Alcohol/drug use: discussed moderation in alcohol intake, the recommendations for healthy alcohol use, and avoidance of illicit drug use  Dental Health: discussed importance of regular tooth brushing, flossing, and dental visits  Injury prevention: discussed safety/seat belts, safety helmets, smoke detectors, carbon dioxide detectors, and smoking near bedding or upholstery  · Exercise: the importance of regular exercise/physical activity was discussed  Recommend exercise 3-5 times per week for at least 30 minutes       Return in about 1 year (around 2022) for Annual physical         Chief Complaint:     Chief Complaint   Patient presents with    Physical Exam      History of Present Illness:     Adult Annual Physical   Patient here for a comprehensive physical exam  The patient reports increased nasal congestion, sinus pressure/ headaches in the past several weeks, no fever/chills, no cough, no sore throat, no purulent mucus production  Patient is taking OTC Zyrtec or Claritin with slight relief  Patient also reports episodes of anxiety in the past few months, there has been a lot of stress at work, patient denies depressed mood or difficulty functioning  Diet and Physical Activity  · Diet/Nutrition: well balanced diet  · Exercise: no formal exercise  Depression Screening  PHQ-9 Depression Screening    PHQ-9:   Frequency of the following problems over the past two weeks:      Little interest or pleasure in doing things: 0 - not at all  Feeling down, depressed, or hopeless: 0 - not at all  PHQ-2 Score: 0       General Health  · Sleep: gets 7-8 hours of sleep on average  · Hearing: decreased - right, follows with ENT  · Vision: goes for regular eye exams  · Dental: regular dental visits  /GYN Health  · Patient is: premenopausal, light periods/ spotting on Mirena  · Last menstrual period:   · Contraceptive method: IUD placement  Review of Systems:     Review of Systems   Constitutional: Negative for appetite change, chills, fatigue, fever and unexpected weight change  HENT: Positive for congestion and sinus pressure  Negative for ear pain, nosebleeds, rhinorrhea, sore throat and trouble swallowing  Eyes: Negative for pain, discharge, redness, itching and visual disturbance  Respiratory: Negative for cough, shortness of breath and wheezing  Cardiovascular: Negative for chest pain, palpitations and leg swelling  Gastrointestinal: Negative for abdominal pain, blood in stool, constipation, diarrhea, nausea and vomiting     Endocrine: Negative for cold intolerance, heat intolerance, polydipsia and polyuria  Genitourinary: Negative for dysuria, frequency, hematuria, pelvic pain, urgency, vaginal bleeding and vaginal discharge  Musculoskeletal: Negative for arthralgias, back pain, gait problem, joint swelling and myalgias  Skin: Negative for rash  Neurological: Positive for headaches  Negative for dizziness, syncope, weakness and numbness  Hematological: Negative for adenopathy  Psychiatric/Behavioral: Negative for dysphoric mood and sleep disturbance  The patient is nervous/anxious         Past Medical History:     Past Medical History:   Diagnosis Date    ASCUS with positive high risk HPV cervical     resolved 12/11/17     Chlamydia     HPV (human papilloma virus) infection     Migraine     Urinary tract infection       Past Surgical History:     Past Surgical History:   Procedure Laterality Date    CERVICAL BIOPSY  W/ LOOP ELECTRODE EXCISION      COLPOSCOPY W/ BIOPSY / CURETTAGE      coloposcopy cervix with biopsy with endocervical curettage 6/1/15 colpo chronic cervicitis     ENDOMETRIAL BIOPSY      without cervical dilation Laurie Seymour 2010    PILONIDAL CYST DRAINAGE  2006    incision and drainage of pilonidal cyst simple       Social History:        Social History     Socioeconomic History    Marital status: /Civil Union     Spouse name: None    Number of children: None    Years of education: None    Highest education level: None   Occupational History    None   Social Needs    Financial resource strain: None    Food insecurity     Worry: None     Inability: None    Transportation needs     Medical: None     Non-medical: None   Tobacco Use    Smoking status: Former Smoker     Packs/day: 0 50     Years: 5 00     Pack years: 2 50    Smokeless tobacco: Never Used   Substance and Sexual Activity    Alcohol use: Yes     Frequency: Monthly or less     Comment: rarely    Drug use: No     Comment: drug use active past usage per allscipt     Sexual activity: Yes Birth control/protection: I U D       Comment: presence of  intrauterine contraceptive device                        Lifestyle    Physical activity     Days per week: None     Minutes per session: None    Stress: None   Relationships    Social connections     Talks on phone: None     Gets together: None     Attends Hinduism service: None     Active member of club or organization: None     Attends meetings of clubs or organizations: None     Relationship status: None    Intimate partner violence     Fear of current or ex partner: None     Emotionally abused: None     Physically abused: None     Forced sexual activity: None   Other Topics Concern    None   Social History Narrative    Daily coffee consumption 6 cups day    Uses safety equipment seatbelts      Family History:     Family History   Problem Relation Age of Onset    No Known Problems Mother     Heart attack Father     Coronary artery disease Father     Hypertension Father     Diabetes Maternal Grandmother     Heart disease Maternal Grandmother     Liver disease Maternal Grandfather         chronic     Hepatitis Family     Ulcers Family     Other Family         urinary incontinence     Varicose Veins Family         of lower extremities     Substance Abuse Neg Hx     Mental illness Neg Hx       Current Medications:     Current Outpatient Medications   Medication Sig Dispense Refill    cetirizine (ZyrTEC) 10 mg tablet Take 10 mg by mouth daily      levonorgestrel (MIRENA) 20 MCG/24HR IUD 1 each by Intrauterine route once      ALPRAZolam (XANAX) 0 25 mg tablet Take 1 tablet (0 25 mg total) by mouth 2 (two) times a day as needed for anxiety 15 tablet 0    EPINEPHrine (EPIPEN) 0 3 mg/0 3 mL SOAJ Inject 0 3 mL (0 3 mg total) into a muscle once for 1 dose 0 6 mL 0    montelukast (SINGULAIR) 10 mg tablet Take 1 tablet (10 mg total) by mouth daily at bedtime 30 tablet 3    valACYclovir (VALTREX) 1,000 mg tablet TAKE 2 TABLETS AT FIRST SIGN OF ERUPTION,THEN 2 TABLETS EVERY 12 HOURS for a total of 4 doses) 8 tablet 6     No current facility-administered medications for this visit  Allergies: Allergies   Allergen Reactions    Mucinex Chest Congestion Child [Guaifenesin] Anaphylaxis    Ampicillin-Sulbactam Sodium Rash     Category: Allergy;     Penicillins Rash     Category: Allergy;     Pollen Extract       Physical Exam:     /72   Pulse 70   Resp 16   Ht 5' 1" (1 549 m)   Wt 65 kg (143 lb 3 2 oz)   BMI 27 06 kg/m²     Physical Exam  Constitutional:       General: She is not in acute distress  Appearance: She is well-developed  HENT:      Head: Normocephalic and atraumatic  Right Ear: Tympanic membrane, ear canal and external ear normal       Left Ear: Tympanic membrane, ear canal and external ear normal       Nose: Mucosal edema present  Mouth/Throat:      Mouth: Mucous membranes are moist       Pharynx: Oropharynx is clear  Eyes:      General: No scleral icterus  Extraocular Movements: Extraocular movements intact  Conjunctiva/sclera: Conjunctivae normal       Pupils: Pupils are equal, round, and reactive to light  Neck:      Musculoskeletal: Neck supple  Thyroid: No thyromegaly  Vascular: No JVD  Cardiovascular:      Rate and Rhythm: Normal rate and regular rhythm  Heart sounds: Normal heart sounds  No murmur  Pulmonary:      Effort: Pulmonary effort is normal  No respiratory distress  Breath sounds: Normal breath sounds  No wheezing, rhonchi or rales  Abdominal:      General: Bowel sounds are normal  There is no distension  Palpations: Abdomen is soft  There is no mass  Tenderness: There is no abdominal tenderness  Musculoskeletal:      Right lower leg: No edema  Left lower leg: No edema  Lymphadenopathy:      Cervical: No cervical adenopathy  Skin:     Findings: No rash  Neurological:      General: No focal deficit present        Mental Status: She is alert and oriented to person, place, and time  Cranial Nerves: No cranial nerve deficit  Psychiatric:         Mood and Affect: Mood normal          Behavior: Behavior normal          Thought Content: Thought content normal          Judgment: Judgment normal         MRI BRAIN AND IAC'S -  WITH AND WITHOUT CONTRAST 10/30/2019    IMPRESSION:     Normal MRI of the brain and IACs  Reagan Levy MD  [address]     BMI Counseling: Body mass index is 27 06 kg/m²  The BMI is above normal  Nutrition recommendations include 3-5 servings of fruits/vegetables daily and consuming healthier snacks  Exercise recommendations include exercising 3-5 times per week

## 2021-06-28 ENCOUNTER — OFFICE VISIT (OUTPATIENT)
Dept: OBGYN CLINIC | Facility: MEDICAL CENTER | Age: 46
End: 2021-06-28
Payer: COMMERCIAL

## 2021-06-28 VITALS
SYSTOLIC BLOOD PRESSURE: 116 MMHG | BODY MASS INDEX: 26.81 KG/M2 | WEIGHT: 142 LBS | DIASTOLIC BLOOD PRESSURE: 78 MMHG | HEIGHT: 61 IN

## 2021-06-28 DIAGNOSIS — B00.2 ORAL HERPES: ICD-10-CM

## 2021-06-28 DIAGNOSIS — B37.3 YEAST VAGINITIS: Primary | ICD-10-CM

## 2021-06-28 PROBLEM — Z30.433 ENCOUNTER FOR IUD REMOVAL AND REINSERTION: Status: RESOLVED | Noted: 2021-01-08 | Resolved: 2021-06-28

## 2021-06-28 PROCEDURE — 1036F TOBACCO NON-USER: CPT | Performed by: PHYSICIAN ASSISTANT

## 2021-06-28 PROCEDURE — 99213 OFFICE O/P EST LOW 20 MIN: CPT | Performed by: PHYSICIAN ASSISTANT

## 2021-06-28 PROCEDURE — 3008F BODY MASS INDEX DOCD: CPT | Performed by: PHYSICIAN ASSISTANT

## 2021-06-28 RX ORDER — FLUCONAZOLE 150 MG/1
TABLET ORAL
Qty: 2 TABLET | Refills: 0 | Status: SHIPPED | OUTPATIENT
Start: 2021-06-28 | End: 2021-07-03

## 2021-06-28 RX ORDER — VALACYCLOVIR HYDROCHLORIDE 1 G/1
TABLET, FILM COATED ORAL
Qty: 8 TABLET | Refills: 6 | Status: SHIPPED | OUTPATIENT
Start: 2021-06-28 | End: 2023-11-25

## 2021-06-28 NOTE — PROGRESS NOTES
Kojo Hallmark  1975    S:  42 y o  female here for a problem visit  She complains of vaginal burning and itching  This feels similar to a yeast infection in the past but she denies vaginal discharge that she had with those in the past  She denies vaginal odor  She notes irritation especially when she uses pantiliners  She requests a refill on Valtrex that she uses for cold sores  Past Medical History:   Diagnosis Date    ASCUS with positive high risk HPV cervical     resolved 12/11/17     Chlamydia     HPV (human papilloma virus) infection     Migraine     Urinary tract infection      Family History   Problem Relation Age of Onset    No Known Problems Mother     Heart attack Father     Coronary artery disease Father     Hypertension Father     Diabetes Maternal Grandmother     Heart disease Maternal Grandmother     Liver disease Maternal Grandfather         chronic     Hepatitis Family     Ulcers Family     Other Family         urinary incontinence     Varicose Veins Family         of lower extremities     Substance Abuse Neg Hx     Mental illness Neg Hx      Social History     Socioeconomic History    Marital status: /Civil Union     Spouse name: None    Number of children: None    Years of education: None    Highest education level: None   Occupational History    None   Tobacco Use    Smoking status: Former Smoker     Packs/day: 0 50     Years: 5 00     Pack years: 2 50    Smokeless tobacco: Never Used   Vaping Use    Vaping Use: Never used   Substance and Sexual Activity    Alcohol use: Yes     Comment: rarely    Drug use: No     Comment: drug use active past usage per allscipt     Sexual activity: Yes     Birth control/protection: I U D       Comment: presence of  intrauterine contraceptive device                        Other Topics Concern    None   Social History Narrative    Daily coffee consumption 6 cups day    Uses safety equipment seatbelts Social Determinants of Health     Financial Resource Strain:     Difficulty of Paying Living Expenses:    Food Insecurity:     Worried About Running Out of Food in the Last Year:     [address] in the Last Year:    Transportation Needs:     Lack of Transportation (Medical):  Lack of Transportation (Non-Medical):    Physical Activity:     Days of Exercise per Week:     Minutes of Exercise per Session:    Stress:     Feeling of Stress :    Social Connections:     Frequency of Communication with Friends and Family:     Frequency of Social Gatherings with Friends and Family:     Attends Confucianism Services:     Active Member of Clubs or Organizations:     Attends Club or Organization Meetings:     Marital Status:    Intimate Partner Violence:     Fear of Current or Ex-Partner:     Emotionally Abused:     Physically Abused:     Sexually Abused:        Review of Systems   Respiratory: Negative  Cardiovascular: Negative  Gastrointestinal: Negative for constipation and diarrhea  Genitourinary: Negative for difficulty urinating, pelvic pain, vaginal bleeding, vaginal discharge, itching or odor  O:  /78 (BP Location: Left arm, Patient Position: Sitting, Cuff Size: Standard)   Ht 5' 1" (1 549 m)   Wt 64 4 kg (142 lb)   BMI 26 83 kg/m²   She appears well and is in no distress  Abdomen is soft and nontender  External genitals show erythema at the introitus  Vagina has scant white discharge  Wet mount reveals one yeast, no clue cells, no trich, pH 3 5, neg whiff, many lactobacillus  A/P: Recommended coconut oil topically to soothe the skin  Diflucan 150mg po x one dose, repeat in 3 days  Call in 2 weeks if not all better    Contact dermatitis - avoid Always branded products   Coconut oil PRN    Valtrex sent to pharmacy

## 2021-11-08 ENCOUNTER — OFFICE VISIT (OUTPATIENT)
Dept: OBGYN CLINIC | Facility: HOSPITAL | Age: 46
End: 2021-11-08
Payer: COMMERCIAL

## 2021-11-08 ENCOUNTER — HOSPITAL ENCOUNTER (OUTPATIENT)
Dept: RADIOLOGY | Facility: HOSPITAL | Age: 46
Discharge: HOME | End: 2021-11-08
Payer: COMMERCIAL

## 2021-11-08 ENCOUNTER — TELEPHONE (OUTPATIENT)
Dept: OBGYN CLINIC | Facility: HOSPITAL | Age: 46
End: 2021-11-08

## 2021-11-08 VITALS
WEIGHT: 142.2 LBS | BODY MASS INDEX: 26.85 KG/M2 | DIASTOLIC BLOOD PRESSURE: 76 MMHG | SYSTOLIC BLOOD PRESSURE: 111 MMHG | HEART RATE: 76 BPM | HEIGHT: 61 IN

## 2021-11-08 DIAGNOSIS — M54.50 LOW BACK PAIN, UNSPECIFIED BACK PAIN LATERALITY, UNSPECIFIED CHRONICITY, UNSPECIFIED WHETHER SCIATICA PRESENT: Primary | ICD-10-CM

## 2021-11-08 DIAGNOSIS — M54.50 LOW BACK PAIN, UNSPECIFIED BACK PAIN LATERALITY, UNSPECIFIED CHRONICITY, UNSPECIFIED WHETHER SCIATICA PRESENT: ICD-10-CM

## 2021-11-08 DIAGNOSIS — M47.816 LUMBAR SPONDYLOSIS: ICD-10-CM

## 2021-11-08 DIAGNOSIS — M51.36 DDD (DEGENERATIVE DISC DISEASE), LUMBAR: ICD-10-CM

## 2021-11-08 PROBLEM — M51.369 DDD (DEGENERATIVE DISC DISEASE), LUMBAR: Status: ACTIVE | Noted: 2021-11-08

## 2021-11-08 PROCEDURE — 99213 OFFICE O/P EST LOW 20 MIN: CPT | Performed by: PHYSICIAN ASSISTANT

## 2021-11-08 PROCEDURE — 72110 X-RAY EXAM L-2 SPINE 4/>VWS: CPT

## 2021-11-08 PROCEDURE — 3008F BODY MASS INDEX DOCD: CPT | Performed by: PHYSICIAN ASSISTANT

## 2021-11-08 PROCEDURE — 1036F TOBACCO NON-USER: CPT | Performed by: PHYSICIAN ASSISTANT

## 2022-01-17 ENCOUNTER — EVALUATION (OUTPATIENT)
Dept: PHYSICAL THERAPY | Facility: CLINIC | Age: 47
End: 2022-01-17
Payer: COMMERCIAL

## 2022-01-17 DIAGNOSIS — M51.36 DDD (DEGENERATIVE DISC DISEASE), LUMBAR: ICD-10-CM

## 2022-01-17 DIAGNOSIS — M47.816 LUMBAR SPONDYLOSIS: ICD-10-CM

## 2022-01-17 PROCEDURE — 97161 PT EVAL LOW COMPLEX 20 MIN: CPT | Performed by: PHYSICAL THERAPIST

## 2022-01-17 PROCEDURE — 97112 NEUROMUSCULAR REEDUCATION: CPT | Performed by: PHYSICAL THERAPIST

## 2022-01-17 NOTE — PROGRESS NOTES
PT Evaluation     Today's date: 2022  Patient name: Earlie Cockayne  : 1975  MRN: [id number]  Referring provider: Elder Celaya  Dx:   Encounter Diagnosis     ICD-10-CM    1  Lumbar spondylosis  M47 816 Ambulatory referral to Physical Therapy   2  DDD (degenerative disc disease), lumbar  M51 36 Ambulatory referral to Physical Therapy                  Assessment  Assessment details: Juanita James is a 40 yo female with chronic low back pain presenting with postural deficits, joint hypermobility, decreased strength of TA and glutes, and the listed functional limitations  She is an excellent candidate for outpatient physical therapy to address the above impairments and optimize functional status  Functional limitations: pain with forward flexion, pain with prolonged sitting, interrupted sleep, pain with prolonged standing/walking, limited lifting, hurts to play golf, unable to take T[employer] classes, currently unable to runBarriers to therapy: May be limited in attendance due to traveling for work    Goals  6-8 weeks  1  Independent with HEP at discharge  2  Tolerate sitting/standing >1 hour to allow resuming PLOF  3  Normalize B glute strength to indicate improved core stability  4  Normalize recruitment/endurance of TA to indicate improved core stability  Plan  Plan details: Provided with and reviewed initial written HEP  Reviewed physical exam findings and plan of care  All questions answered to patient's satisfaction    Recommend PT 1-2x/week x 6-8 weeks  Patient would benefit from: skilled physical therapy  Planned modality interventions: low level laser therapy  Planned therapy interventions: manual therapy, neuromuscular re-education, patient education, therapeutic activities, therapeutic exercise and home exercise program  Duration in weeks: 8  Treatment plan discussed with: patient        Subjective Evaluation    History of Present Illness  Mechanism of injury: Pt has had right sided low back pain for the last 4 years ever since she was traveling and picked up her luggage  For the last couple of months, the pain has spread to both sides  She will sometimes have pain travel into her both legs but not below the knee  She does admit some urinary incontinence that has worsened, however, this has been ongoing since she had children  She denies any recent fevers or weakness  She has seen a chiropractor every other week for the last 1-2 years with some relief  She also had a steroid injection at her R SI joint with no relief  She arrives today with referral to OPPT     Pain  Current pain ratin  At worst pain ratin  Location: R SI  Progression: worsening    Social Support    Employment status: working (sits all day)  Exercise history: yi, running, golf      Diagnostic Tests  X-ray: normal  Treatments  Previous treatment: chiropractic  Current treatment: chiropractic  Patient Goals  Patient goals for therapy: decreased pain, increased motion, increased strength, return to sport/leisure activities and independence with ADLs/IADLs          Objective     Postural Observations  Seated posture: poor  Standing posture: good    Additional Postural Observation Details  Sacral sitting    Active Range of Motion     Lumbar   Flexion:  WFL  Extension:  WFL  Left lateral flexion:  WFL  Right lateral flexion:  WFL  Left rotation:  WFL  Right rotation:  WFL    Passive Range of Motion     Additional Passive Range of Motion Details  Hamstrings, quads, hip flexors, piriformis all WNL bilaterally    Joint Play     Hypermobile: L1, L2, L3, L4, L5 and S1     Pain: L4, L5 and S1     Strength/Myotome Testing     Left Hip   Planes of Motion   Flexion: 5  Extension: 4  Abduction: 4    Right Hip   Planes of Motion   Flexion: 5  Extension: 4  Abduction: 4    Left Knee   Flexion: 5  Extension: 5    Right Knee   Flexion: 5  Extension: 5    Left Ankle/Foot   Dorsiflexion: 5  Plantar flexion: 5    Right Ankle/Foot Dorsiflexion: 5  Plantar flexion: 5    Muscle Activation     Additional Muscle Activation Details  Fair recruitment and poor endurance of TA; cueing needed to avoid glute substitution    Tests     Lumbar   Positive prone instability  and sacral spring   Left Hip   Negative long sit  Right Hip   Negative long sit  Additional Tests Details  Normal sacral base sit slump      HEP provided as follows:  Access Code: [email or web address]  URL: [email or web address]  Date: 01/17/2022  Prepared by: Sara Mtz    Exercises  · Supine Transversus Abdominis Bracing with Pelvic Floor Contraction - 2 x daily - 7 x weekly - 10 reps - 10 hold  · Supine Bridge with Pelvic Floor Contraction - 2 x daily - 7 x weekly - 10 reps - 10 hold  · Sidelying Pelvic Floor Contraction with Hip Abduction - 2 x daily - 7 x weekly - 2 sets - 10 reps  · Quadruped Exhale with Pelvic Floor Contraction and Leg Extension - 2 x daily - 7 x weekly - 2 sets - 10 reps             Precautions: none  [id number]    Manuals 1/17                                                                Neuro Re-Ed             TA + PF HEP            TA + PF + bridge HEP            TA + PF + hip abd HEP            TA + PF + bent knee fall out             TA + PF + heel taps             TA + PF + donkey kick HEP                         Ther Ex                                                                                                                     Ther Activity             TA + treadmill             TA + squats             Step up + glute sq                                       Gait Training                                       Modalities

## 2022-01-31 ENCOUNTER — TELEPHONE (OUTPATIENT)
Dept: FAMILY MEDICINE CLINIC | Facility: CLINIC | Age: 47
End: 2022-01-31

## 2022-02-01 ENCOUNTER — APPOINTMENT (OUTPATIENT)
Dept: PHYSICAL THERAPY | Facility: CLINIC | Age: 47
End: 2022-02-01
Payer: COMMERCIAL

## 2022-02-17 ENCOUNTER — APPOINTMENT (OUTPATIENT)
Dept: PHYSICAL THERAPY | Facility: CLINIC | Age: 47
End: 2022-02-17
Payer: COMMERCIAL

## 2022-03-01 ENCOUNTER — OFFICE VISIT (OUTPATIENT)
Dept: PHYSICAL THERAPY | Facility: CLINIC | Age: 47
End: 2022-03-01
Payer: COMMERCIAL

## 2022-03-01 DIAGNOSIS — M47.816 LUMBAR SPONDYLOSIS: ICD-10-CM

## 2022-03-01 DIAGNOSIS — M51.36 DDD (DEGENERATIVE DISC DISEASE), LUMBAR: Primary | ICD-10-CM

## 2022-03-01 PROCEDURE — 97530 THERAPEUTIC ACTIVITIES: CPT | Performed by: PHYSICAL THERAPIST

## 2022-03-01 PROCEDURE — 97112 NEUROMUSCULAR REEDUCATION: CPT | Performed by: PHYSICAL THERAPIST

## 2022-03-01 NOTE — PROGRESS NOTES
Reassessment    Today's date: 3/1/2022  Patient name: Arya Medina  : 1975  MRN: [id number]  Referring provider: Narcisa Hernandez  Dx:   Encounter Diagnosis     ICD-10-CM    1  DDD (degenerative disc disease), lumbar  M51 36    2  Lumbar spondylosis  M47 816                     Assessment  Assessment details: Ammon Sánchez demonstrates improved strength and core stability since initial evaluation  She also reports decreased severity of pain  She continues to have limited endurance of transverse abs and pelvic floor  She remains an excellent candidate for OPPT to address continued limitations and optimize functional status  Focus on HEP with follow up in 3-4 weeks  Functional limitations: pain with prolonged sitting, pain with prolonged standing/walking, limited lifting    Goals  6-8 weeks  1  Independent with HEP at discharge  - ongoing  2  Tolerate sitting/standing >1 hour to allow resuming PLOF  - ongoing  3  Normalize B glute strength to indicate improved core stability  - MET  4  Normalize recruitment/endurance of TA to indicate improved core stability  - progressing very well        Plan  Continue PT 1-2x/month  Patient would benefit from: skilled physical therapy  Planned modality interventions: low level laser therapy  Planned therapy interventions: manual therapy, neuromuscular re-education, patient education, therapeutic activities, therapeutic exercise and home exercise program  Duration in weeks: 8  Treatment plan discussed with: patient        Subjective Evaluation  Patient reports not much has changed since her initial evaluation, however, she's been very limited with her HEP participation  She thinks she's been doing her exercises maybe once per week  She hasn't had any recent severe pain  She's been traveling a lot for work lately but will be changing jobs in two weeks      Pain  Current pain ratin  At worst pain rating: 3  Location: R SI  Progression: improved    Social Support    Employment status: working (sits all day)  Exercise history: yi, running, golf      Diagnostic Tests  X-ray: normal  Treatments  Previous treatment: chiropractic  Current treatment: chiropractic  Patient Goals  Patient goals for therapy: decreased pain, increased motion, increased strength, return to sport/leisure activities and independence with ADLs/IADLs          Objective     Active Range of Motion     Lumbar   Flexion:  WFL  Extension:  WFL  Left lateral flexion:  WFL  Right lateral flexion:  WFL  Left rotation:  WFL  Right rotation:  [facility]    Passive Range of Motion     Additional Passive Range of Motion Details  Hamstrings, quads, hip flexors, piriformis all WNL bilaterally    Joint Play     Hypermobile: L1, L2, L3, L4, L5 and S1       Strength/Myotome Testing     Left Hip   Planes of Motion   Flexion: 5  Extension: 5  Abduction: 5    Right Hip   Planes of Motion   Flexion: 5  Extension: 5  Abduction: 5    Left Knee   Flexion: 5  Extension: 5    Right Knee   Flexion: 5  Extension: 5    Left Ankle/Foot   Dorsiflexion: 5  Plantar flexion: 5    Right Ankle/Foot   Dorsiflexion: 5  Plantar flexion: 5    Muscle Activation     Additional Muscle Activation Details  Good recruitment and fair endurance of TA    Tests     Lumbar   Negative prone instability  and sacral spring   Left Hip   Negative long sit  Right Hip   Negative long sit       Additional Tests Details  Normal sacral base sit slump       Precautions: none  [id number]    Manuals 1/17 3/1                                                               Neuro Re-Ed             TA + PF HEP rev           TA + PF + bridge HEP            TA + PF + hip abd HEP            TA + PF + bent knee fall out  10x           TA + PF + heel taps  10x           TA + PF + donkey kick HEP rev           HEP update/review  10 min           Ther Ex                                                                                                                     Ther Activity             TA + treadmill  8 min 2 0mph           TA + squats  NV           Step up + glute sq  NV                                     Gait Training                                       Modalities

## 2022-03-22 ENCOUNTER — APPOINTMENT (OUTPATIENT)
Dept: PHYSICAL THERAPY | Facility: CLINIC | Age: 47
End: 2022-03-22
Payer: COMMERCIAL

## 2022-07-27 ENCOUNTER — OFFICE VISIT (OUTPATIENT)
Dept: FAMILY MEDICINE CLINIC | Facility: CLINIC | Age: 47
End: 2022-07-27
Payer: COMMERCIAL

## 2022-07-27 VITALS
WEIGHT: 145.8 LBS | DIASTOLIC BLOOD PRESSURE: 70 MMHG | SYSTOLIC BLOOD PRESSURE: 112 MMHG | BODY MASS INDEX: 26.83 KG/M2 | HEART RATE: 62 BPM | RESPIRATION RATE: 16 BRPM | HEIGHT: 62 IN

## 2022-07-27 DIAGNOSIS — Z13.220 SCREENING FOR CHOLESTEROL LEVEL: ICD-10-CM

## 2022-07-27 DIAGNOSIS — Z13.29 SCREENING FOR THYROID DISORDER: ICD-10-CM

## 2022-07-27 DIAGNOSIS — Z13.228 SCREENING FOR METABOLIC DISORDER: ICD-10-CM

## 2022-07-27 DIAGNOSIS — Z12.31 ENCOUNTER FOR SCREENING MAMMOGRAM FOR MALIGNANT NEOPLASM OF BREAST: ICD-10-CM

## 2022-07-27 DIAGNOSIS — J30.9 ALLERGIC RHINITIS, UNSPECIFIED SEASONALITY, UNSPECIFIED TRIGGER: ICD-10-CM

## 2022-07-27 DIAGNOSIS — Z13.89 SCREENING FOR BLOOD OR PROTEIN IN URINE: ICD-10-CM

## 2022-07-27 DIAGNOSIS — Z13.1 SCREENING FOR DIABETES MELLITUS: ICD-10-CM

## 2022-07-27 DIAGNOSIS — Z00.00 ANNUAL PHYSICAL EXAM: Primary | ICD-10-CM

## 2022-07-27 DIAGNOSIS — Z13.0 SCREENING FOR BLOOD DISEASE: ICD-10-CM

## 2022-07-27 PROCEDURE — 3725F SCREEN DEPRESSION PERFORMED: CPT | Performed by: FAMILY MEDICINE

## 2022-07-27 PROCEDURE — 99396 PREV VISIT EST AGE 40-64: CPT | Performed by: FAMILY MEDICINE

## 2022-07-27 RX ORDER — MONTELUKAST SODIUM 10 MG/1
10 TABLET ORAL
Qty: 90 TABLET | Refills: 1 | Status: SHIPPED | OUTPATIENT
Start: 2022-07-27

## 2022-07-27 NOTE — PROGRESS NOTES
ADULT ANNUAL PHYSICAL  [facility]    NAME: Tati Pink  AGE: 41 y o  SEX: female  : 1975     DATE: 2022     Assessment and Plan:     1  Annual physical exam  - will obtain screening labs and mammogram  - Mammo screening bilateral w 3d & cad; Future  - CBC and differential; Future  - Lipid Panel with Direct LDL reflex; Future  - Comprehensive metabolic panel; Future  - UA (URINE) with reflex to Scope  - TSH, 3rd generation with Free T4 reflex; Future    2  Allergic rhinitis  - stable on Zyrtec and Singulair  - montelukast (SINGULAIR) 10 mg tablet; Take 1 tablet (10 mg total) by mouth daily at bedtime  Dispense: 90 tablet; Refill: 1    Immunizations and preventive care screenings were discussed with patient today  Appropriate education was printed on patient's after visit summary  Counseling:  Alcohol/drug use: discussed moderation in alcohol intake, the recommendations for healthy alcohol use, and avoidance of illicit drug use  Dental Health: discussed importance of regular tooth brushing, flossing, and dental visits  Injury prevention: discussed safety/seat belts, safety helmets, smoke detectors, carbon dioxide detectors, and smoking near bedding or upholstery  · Exercise: the importance of regular exercise/physical activity was discussed  Recommend exercise 3-5 times per week for at least 30 minutes  Return in about 1 year (around 2023) for Annual physical         Chief Complaint:     Chief Complaint   Patient presents with    Physical Exam      History of Present Illness:     Adult Annual Physical   Patient here for a comprehensive physical exam  The patient reports no problems  Diet and Physical Activity  · Diet/Nutrition: well balanced diet  · Exercise: walking        Depression Screening  PHQ-2/9 Depression Screening    Little interest or pleasure in doing things: 0 - not at all  Feeling down, depressed, or hopeless: 0 - not at all  PHQ-2 Score: 0  PHQ-2 Interpretation: Negative depression screen       General Health  · Sleep: sleeps well  · Hearing: decreased - right, normal - left  · Vision: most recent eye exam >1 year ago  · Dental: regular dental visits  /GYN Health  · Patient is: premenopausal  · Last menstrual period:   · Contraceptive method: IUD placement  Review of Systems:     Review of Systems   Constitutional: Negative for appetite change, chills, fatigue, fever and unexpected weight change  HENT: Negative for congestion, ear pain, nosebleeds, rhinorrhea, sore throat and trouble swallowing  Eyes: Negative for pain, discharge, redness, itching and visual disturbance  Respiratory: Negative for cough, shortness of breath and wheezing  Cardiovascular: Negative for chest pain, palpitations and leg swelling  Gastrointestinal: Negative for abdominal pain, blood in stool, constipation, diarrhea, nausea and vomiting  Endocrine: Negative for cold intolerance, heat intolerance, polydipsia and polyuria  Genitourinary: Negative for dysuria, frequency, hematuria, pelvic pain, urgency and vaginal discharge  Musculoskeletal: Negative for arthralgias, gait problem and joint swelling  Skin: Negative for rash  Neurological: Negative for dizziness, syncope, weakness, numbness and headaches  Hematological: Negative for adenopathy  Psychiatric/Behavioral: Negative for dysphoric mood and sleep disturbance  The patient is not nervous/anxious         Past Medical History:     Past Medical History:   Diagnosis Date    ASCUS with positive high risk HPV cervical     resolved 12/11/17     Chlamydia     HPV (human papilloma virus) infection     Migraine     Urinary tract infection       Past Surgical History:     Past Surgical History:   Procedure Laterality Date    CERVICAL BIOPSY  W/ LOOP ELECTRODE EXCISION      COLPOSCOPY W/ BIOPSY / CURETTAGE      coloposcopy cervix with biopsy with endocervical curettage 6/1/15 colpo chronic cervicitis     ENDOMETRIAL BIOPSY      without cervical dilation Jamee Pill 2010    PILONIDAL CYST DRAINAGE  2006    incision and drainage of pilonidal cyst simple       Social History:     Social History     Socioeconomic History    Marital status: /Civil Union     Spouse name: None    Number of children: None    Years of education: None    Highest education level: None   Occupational History    None   Tobacco Use    Smoking status: Former Smoker     Packs/day: 0 50     Years: 5 00     Pack years: 2 50    Smokeless tobacco: Never Used   Vaping Use    Vaping Use: Never used   Substance and Sexual Activity    Alcohol use: Yes     Comment: rarely    Drug use: No     Comment: drug use active past usage per allscipt     Sexual activity: Yes     Birth control/protection: I U D       Comment: presence of  intrauterine contraceptive device                        Other Topics Concern    None   Social History Narrative    Daily coffee consumption 6 cups day    Uses safety equipment seatbelts     Social Determinants of Health     Financial Resource Strain: Not on file   Food Insecurity: Not on file   Transportation Needs: Not on file   Physical Activity: Not on file   Stress: Not on file   Social Connections: Not on file   Intimate Partner Violence: Not on file   Housing Stability: Not on file      Family History:     Family History   Problem Relation Age of Onset    No Known Problems Mother     Heart attack Father     Coronary artery disease Father     Hypertension Father     Diabetes Maternal Grandmother     Heart disease Maternal Grandmother     Liver disease Maternal Grandfather         chronic     Hepatitis Family     Ulcers Family     Other Family         urinary incontinence     Varicose Veins Family         of lower extremities     Substance Abuse Neg Hx     Mental illness Neg Hx       Current Medications:     Current Outpatient Medications   Medication Sig Dispense Refill    ALPRAZolam (XANAX) 0 25 mg tablet Take 1 tablet (0 25 mg total) by mouth 2 (two) times a day as needed for anxiety 15 tablet 0    cetirizine (ZyrTEC) 10 mg tablet Take 10 mg by mouth daily      levonorgestrel (MIRENA) 20 MCG/24HR IUD 1 each by Intrauterine route once      montelukast (SINGULAIR) 10 mg tablet Take 1 tablet (10 mg total) by mouth daily at bedtime 90 tablet 1    valACYclovir (VALTREX) 1,000 mg tablet TAKE 2 TABLETS AT FIRST SIGN OF ERUPTION,THEN 2 TABLETS EVERY 12 HOURS for a total of 4 doses) (Patient taking differently: if needed TAKE 2 TABLETS AT FIRST SIGN OF ERUPTION,THEN 2 TABLETS EVERY 12 HOURS for a total of 4 doses)) 8 tablet 6    EPINEPHrine (EPIPEN) 0 3 mg/0 3 mL SOAJ Inject 0 3 mL (0 3 mg total) into a muscle once for 1 dose 0 6 mL 0     No current facility-administered medications for this visit  Allergies: Allergies   Allergen Reactions    Mucinex Chest Congestion Child [Guaifenesin] Anaphylaxis    Ampicillin-Sulbactam Sodium Rash     Category: Allergy;     Penicillins Rash     Category: Allergy;     Pollen Extract       Physical Exam:     /70   Pulse 62   Resp 16   Ht 5' 1 5" (1 562 m)   Wt 66 1 kg (145 lb 12 8 oz)   BMI 27 10 kg/m²     Physical Exam  Constitutional:       General: She is not in acute distress  Appearance: She is well-developed  HENT:      Head: Normocephalic and atraumatic  Right Ear: Tympanic membrane, ear canal and external ear normal       Left Ear: Tympanic membrane, ear canal and external ear normal       Mouth/Throat:      Mouth: Mucous membranes are moist       Pharynx: Oropharynx is clear  Eyes:      General: No scleral icterus  Extraocular Movements: Extraocular movements intact  Conjunctiva/sclera: Conjunctivae normal       Pupils: Pupils are equal, round, and reactive to light  Neck:      Thyroid: No thyromegaly  Vascular: No JVD     Cardiovascular: Rate and Rhythm: Normal rate and regular rhythm  Heart sounds: Normal heart sounds  No murmur heard  Pulmonary:      Effort: Pulmonary effort is normal  No respiratory distress  Breath sounds: Normal breath sounds  No wheezing, rhonchi or rales  Abdominal:      General: Bowel sounds are normal  There is no distension  Palpations: Abdomen is soft  There is no mass  Tenderness: There is no abdominal tenderness  Musculoskeletal:      Cervical back: Neck supple  Right lower leg: No edema  Left lower leg: No edema  Lymphadenopathy:      Cervical: No cervical adenopathy  Skin:     Findings: No rash  Neurological:      General: No focal deficit present  Mental Status: She is alert and oriented to person, place, and time  Cranial Nerves: No cranial nerve deficit  Psychiatric:         Mood and Affect: Mood normal          Behavior: Behavior normal          Thought Content: Thought content normal          Judgment: Judgment normal         Patsy Boeck, MD  [address]     BMI Counseling: Body mass index is 27 1 kg/m²  The BMI is above normal  Nutrition recommendations include 3-5 servings of fruits/vegetables daily and consuming healthier snacks  Exercise recommendations include exercising 3-5 times per week

## 2022-07-27 NOTE — PATIENT INSTRUCTIONS

## 2023-06-08 DIAGNOSIS — J30.9 ALLERGIC RHINITIS, UNSPECIFIED SEASONALITY, UNSPECIFIED TRIGGER: ICD-10-CM

## 2023-06-08 RX ORDER — MONTELUKAST SODIUM 10 MG/1
TABLET ORAL
Qty: 90 TABLET | Refills: 1 | Status: SHIPPED | OUTPATIENT
Start: 2023-06-08

## 2023-06-30 ENCOUNTER — OFFICE VISIT (OUTPATIENT)
Dept: FAMILY MEDICINE CLINIC | Facility: CLINIC | Age: 48
End: 2023-06-30
Payer: COMMERCIAL

## 2023-06-30 VITALS
SYSTOLIC BLOOD PRESSURE: 110 MMHG | DIASTOLIC BLOOD PRESSURE: 70 MMHG | OXYGEN SATURATION: 95 % | BODY MASS INDEX: 27.36 KG/M2 | TEMPERATURE: 98.3 F | HEIGHT: 62 IN | WEIGHT: 148.7 LBS | HEART RATE: 76 BPM | RESPIRATION RATE: 16 BRPM

## 2023-06-30 DIAGNOSIS — R05.1 ACUTE COUGH: ICD-10-CM

## 2023-06-30 DIAGNOSIS — J02.9 SORE THROAT: Primary | ICD-10-CM

## 2023-06-30 LAB
S PYO AG THROAT QL: NEGATIVE
SARS-COV-2 AG UPPER RESP QL IA: NEGATIVE
VALID CONTROL: NORMAL

## 2023-06-30 RX ORDER — BENZONATATE 200 MG/1
200 CAPSULE ORAL 3 TIMES DAILY PRN
Qty: 20 CAPSULE | Refills: 0 | Status: SHIPPED | OUTPATIENT
Start: 2023-06-30

## 2023-06-30 NOTE — PROGRESS NOTES
"Assessment/Plan:     Diagnoses and all orders for this visit:    Sore throat  -     POCT rapid strepA  -     Throat culture    Rapid strep negative  Throat culture sent  Sore throat most likely aggravated by PND and cough  Pt encouraged to continue allergy medication and start decongestant OTC  Pt to keep well hydrated  Patient is encouraged to call our office for any questions/concerns, persistent or worsening symptoms  Patient states they understand and agree with treatment plan  Acute cough  -     POCT Rapid Covid Ag  -     benzonatate (TESSALON) 200 MG capsule; Take 1 capsule (200 mg total) by mouth 3 (three) times a day as needed for cough      Tessalon perles ordered PRN for patient  Medication reviewed  Pt to rest and keep well hydrated  Patient is encouraged to call our office for any questions/concerns, persistent or worsening symptoms  Patient states they understand and agree with treatment plan  Pt to f/u PRN  Subjective:      Patient ID: Brandy Brush is a 57 y o  female  Pt here today for sore throat, PND, dry cough that started 1 week ago  No fever, chills, body aches, change in appetite, nvd  Pt has been taking a cough suppressant and decongestant since Wednesday and has not noted any relief  Pt does regularly take allergy medication  She does not take Flonase because it gives her a headache  The following portions of the patient's history were reviewed and updated as appropriate: allergies, current medications, past family history, past medical history, past social history, past surgical history and problem list     Review of Systems    As noted per HPI  Objective:      /70   Pulse 76   Temp 98 3 °F (36 8 °C)   Resp 16   Ht 5' 1 5\" (1 562 m)   Wt 67 4 kg (148 lb 11 2 oz)   SpO2 95%   BMI 27 64 kg/m²          Physical Exam  Constitutional:       Appearance: Normal appearance  She is well-developed     HENT:      Right Ear: Tympanic membrane, ear canal and " external ear normal       Left Ear: Tympanic membrane, ear canal and external ear normal       Nose: No congestion or rhinorrhea  Mouth/Throat:      Pharynx: No oropharyngeal exudate or posterior oropharyngeal erythema  Cardiovascular:      Rate and Rhythm: Normal rate and regular rhythm  Pulmonary:      Effort: Pulmonary effort is normal       Breath sounds: Normal breath sounds  Lymphadenopathy:      Cervical: No cervical adenopathy  Neurological:      Mental Status: She is alert and oriented to person, place, and time  Mental status is at baseline  Psychiatric:         Mood and Affect: Mood normal          Behavior: Behavior normal          Thought Content:  Thought content normal          Judgment: Judgment normal

## 2023-08-08 ENCOUNTER — OFFICE VISIT (OUTPATIENT)
Dept: FAMILY MEDICINE CLINIC | Facility: CLINIC | Age: 48
End: 2023-08-08
Payer: COMMERCIAL

## 2023-08-08 VITALS
HEIGHT: 61 IN | SYSTOLIC BLOOD PRESSURE: 110 MMHG | DIASTOLIC BLOOD PRESSURE: 70 MMHG | TEMPERATURE: 97.5 F | RESPIRATION RATE: 14 BRPM | HEART RATE: 61 BPM | BODY MASS INDEX: 27.03 KG/M2 | WEIGHT: 143.2 LBS

## 2023-08-08 DIAGNOSIS — Z12.31 ENCOUNTER FOR SCREENING MAMMOGRAM FOR MALIGNANT NEOPLASM OF BREAST: ICD-10-CM

## 2023-08-08 DIAGNOSIS — Z12.11 SCREENING FOR COLON CANCER: ICD-10-CM

## 2023-08-08 DIAGNOSIS — Z13.220 SCREENING FOR CHOLESTEROL LEVEL: ICD-10-CM

## 2023-08-08 DIAGNOSIS — Z13.89 SCREENING FOR BLOOD OR PROTEIN IN URINE: ICD-10-CM

## 2023-08-08 DIAGNOSIS — E55.9 VITAMIN D DEFICIENCY: ICD-10-CM

## 2023-08-08 DIAGNOSIS — Z13.1 SCREENING FOR DIABETES MELLITUS: ICD-10-CM

## 2023-08-08 DIAGNOSIS — Z13.0 SCREENING FOR BLOOD DISEASE: ICD-10-CM

## 2023-08-08 DIAGNOSIS — Z13.228 SCREENING FOR METABOLIC DISORDER: ICD-10-CM

## 2023-08-08 DIAGNOSIS — Z13.29 SCREENING FOR THYROID DISORDER: ICD-10-CM

## 2023-08-08 DIAGNOSIS — Z00.00 ANNUAL PHYSICAL EXAM: Primary | ICD-10-CM

## 2023-08-08 PROCEDURE — 99396 PREV VISIT EST AGE 40-64: CPT | Performed by: FAMILY MEDICINE

## 2023-08-08 NOTE — PROGRESS NOTES
ADULT ANNUAL PHYSICAL  [id number] [facility]    NAME: Nica Escoto  AGE: 57 y.o. SEX: female  : 1975     DATE: 2023     Assessment and Plan:     Annual physical exam  - screening labs and mammogram ordered and we will call with results once available  - Mammo screening bilateral w 3d & cad; Future  - CBC and differential; Future  - Lipid Panel with Direct LDL reflex; Future  - Comprehensive metabolic panel; Future  - UA (URINE) with reflex to Scope  - Vitamin D 25 hydroxy; Future  - TSH, 3rd generation with Free T4 reflex; Future    Screening for colon cancer  - Cologuard      Immunizations and preventive care screenings were discussed with patient today. Appropriate education was printed on patient's after visit summary. Counseling:  Alcohol/drug use: discussed moderation in alcohol intake, the recommendations for healthy alcohol use, and avoidance of illicit drug use. Dental Health: discussed importance of regular tooth brushing, flossing, and dental visits. Injury prevention: discussed safety/seat belts, safety helmets, smoke detectors, carbon dioxide detectors, and smoking near bedding or upholstery. · Exercise: the importance of regular exercise/physical activity was discussed. Recommend exercise 3-5 times per week for at least 30 minutes. Return in about 1 year (around 2024) for Annual physical.        Chief Complaint:     Chief Complaint   Patient presents with   • Physical Exam      History of Present Illness:     Adult Annual Physical   Patient here for a comprehensive physical exam. The patient reports no problems. Diet and Physical Activity  · Diet/Nutrition: well balanced diet. · Exercise: 3-4 times a week on average.       Depression Screening  PHQ-2/9 Depression Screening    Little interest or pleasure in doing things: 0 - not at all  Feeling down, depressed, or hopeless: 0 - not at all  PHQ-2 Score: 0  PHQ-2 Interpretation: Negative depression screen       General Health  · Sleep: sleeps well and gets 7-8 hours of sleep on average. · Hearing: decreased - right, normal - left  · Vision: most recent eye exam >1 year ago. · Dental: regular dental visits. /GYN Health  · Patient is: premenopausal  · Last menstrual period:   · Contraceptive method: IUD placement. Review of Systems:     Review of Systems   Constitutional: Negative for appetite change, chills, fatigue, fever and unexpected weight change. HENT: Negative for congestion, ear pain, nosebleeds, rhinorrhea, sore throat and trouble swallowing. Eyes: Negative for pain, discharge, redness, itching and visual disturbance. Respiratory: Negative for cough, shortness of breath and wheezing. Cardiovascular: Negative for chest pain, palpitations and leg swelling. Gastrointestinal: Negative for abdominal pain, blood in stool, constipation, diarrhea, nausea and vomiting. Endocrine: Negative for cold intolerance, heat intolerance, polydipsia and polyuria. Genitourinary: Negative for dysuria, frequency, hematuria, pelvic pain, urgency and vaginal discharge. Musculoskeletal: Negative for arthralgias, gait problem and joint swelling. Skin: Negative for rash. Neurological: Negative for dizziness, syncope, weakness, numbness and headaches. Hematological: Negative for adenopathy. Psychiatric/Behavioral: Negative for dysphoric mood and sleep disturbance. The patient is not nervous/anxious.        Past Medical History:     Past Medical History:   Diagnosis Date   • Allergic    • Anxiety    • ASCUS with positive high risk HPV cervical     resolved 12/11/17    • Chlamydia    • GERD (gastroesophageal reflux disease)    • Headache(784.0)    • HL (hearing loss)    • HPV (human papilloma virus) infection    • Migraine    • Urinary tract infection       Past Surgical History:     Past Surgical History:   Procedure Laterality Date   • CERVICAL BIOPSY  W/ LOOP ELECTRODE EXCISION     • COLPOSCOPY W/ BIOPSY / Dave Bustard      coloposcopy cervix with biopsy with endocervical curettage 6/1/15 colpo chronic cervicitis    • ENDOMETRIAL BIOPSY      without cervical dilation Muriel Sarmiento 2010   • PILONIDAL CYST DRAINAGE  2006    incision and drainage of pilonidal cyst simple       Social History:     Social History     Socioeconomic History   • Marital status: /Civil Union     Spouse name: None   • Number of children: None   • Years of education: None   • Highest education level: None   Occupational History   • None   Tobacco Use   • Smoking status: Former     Packs/day: 0.50     Years: 5.00     Total pack years: 2.50     Types: Cigarettes   • Smokeless tobacco: Never   Vaping Use   • Vaping Use: Never used   Substance and Sexual Activity   • Alcohol use: Yes     Comment: rarely   • Drug use: No     Comment: drug use active past usage per [employer]    • Sexual activity: Yes     Partners: Male     Birth control/protection: I.U.D.      Comment: presence of  intrauterine contraceptive device                        Other Topics Concern   • None   Social History Narrative    Daily coffee consumption 6 cups day    Uses safety equipment seatbelts     Social Determinants of Health     Financial Resource Strain: Not on file   Food Insecurity: Not on file   Transportation Needs: Not on file   Physical Activity: Not on file   Stress: Not on file   Social Connections: Not on file   Intimate Partner Violence: Not on file   Housing Stability: Not on file      Family History:     Family History   Problem Relation Age of Onset   • Hypertension Mother    • Thyroid disease Mother    • Heart attack Father    • Coronary artery disease Father    • Hypertension Father    • Heart disease Father    • Glaucoma Father    • Diabetes Maternal Grandmother    • Heart disease Maternal Grandmother    • Liver disease Maternal Grandfather         chronic    • Hepatitis Family    • Ulcers Family    • Other Family         urinary incontinence    • Varicose Veins Family         of lower extremities    • Cancer Maternal Aunt    • Substance Abuse Neg Hx    • Mental illness Neg Hx       Current Medications:     Current Outpatient Medications   Medication Sig Dispense Refill   • ALPRAZolam (XANAX) 0.25 mg tablet Take 1 tablet (0.25 mg total) by mouth 2 (two) times a day as needed for anxiety 15 tablet 0   • cetirizine (ZyrTEC) 10 mg tablet Take 10 mg by mouth daily     • levonorgestrel (MIRENA) 20 MCG/24HR IUD 1 each by Intrauterine route once     • montelukast (SINGULAIR) 10 mg tablet TAKE 1 TABLET BY MOUTH DAILY AT BEDTIME 90 tablet 1   • valACYclovir (VALTREX) 1,000 mg tablet TAKE 2 TABLETS AT FIRST SIGN OF ERUPTION,THEN 2 TABLETS EVERY 12 HOURS for a total of 4 doses) (Patient taking differently: if needed TAKE 2 TABLETS AT FIRST SIGN OF ERUPTION,THEN 2 TABLETS EVERY 12 HOURS for a total of 4 doses)) 8 tablet 6   • EPINEPHrine (EPIPEN) 0.3 mg/0.3 mL SOAJ Inject 0.3 mL (0.3 mg total) into a muscle once for 1 dose 0.6 mL 0     No current facility-administered medications for this visit. Allergies: Allergies   Allergen Reactions   • Mucinex Chest Congestion Child [Guaifenesin] Anaphylaxis   • Ampicillin-Sulbactam Sodium Rash     Category: Allergy;    • Penicillins Rash     Category: Allergy;    • Pollen Extract       Physical Exam:     /70   Pulse 61   Temp 97.5 °F (36.4 °C)   Resp 14   Ht 5' 1.25" (1.556 m)   Wt 65 kg (143 lb 3.2 oz)   BMI 26.84 kg/m²     Physical Exam  Constitutional:       General: She is not in acute distress. Appearance: She is well-developed. HENT:      Head: Normocephalic and atraumatic. Right Ear: Tympanic membrane, ear canal and external ear normal.      Left Ear: Tympanic membrane, ear canal and external ear normal.      Mouth/Throat:      Mouth: Mucous membranes are moist.      Pharynx: Oropharynx is clear.    Eyes:      General: No scleral icterus. Extraocular Movements: Extraocular movements intact. Conjunctiva/sclera: Conjunctivae normal.      Pupils: Pupils are equal, round, and reactive to light. Neck:      Thyroid: No thyromegaly. Vascular: No JVD. Cardiovascular:      Rate and Rhythm: Normal rate and regular rhythm. Heart sounds: Normal heart sounds. No murmur heard. Pulmonary:      Effort: Pulmonary effort is normal. No respiratory distress. Breath sounds: Normal breath sounds. No wheezing, rhonchi or rales. Abdominal:      General: Bowel sounds are normal. There is no distension. Palpations: Abdomen is soft. There is no mass. Tenderness: There is no abdominal tenderness. Musculoskeletal:      Cervical back: Neck supple. Right lower leg: No edema. Left lower leg: No edema. Lymphadenopathy:      Cervical: No cervical adenopathy. Skin:     Findings: No rash. Neurological:      General: No focal deficit present. Mental Status: She is alert and oriented to person, place, and time. Cranial Nerves: No cranial nerve deficit. Psychiatric:         Mood and Affect: Mood normal.         Behavior: Behavior normal.         Thought Content: Thought content normal.         Judgment: Judgment normal.        Handy Orozco MD  [address]     BMI Counseling: Body mass index is 26.84 kg/m². The BMI is above normal. Nutrition recommendations include 3-5 servings of fruits/vegetables daily and consuming healthier snacks. Exercise recommendations include exercising 3-5 times per week.

## 2023-09-27 DIAGNOSIS — J30.9 ALLERGIC RHINITIS, UNSPECIFIED SEASONALITY, UNSPECIFIED TRIGGER: ICD-10-CM

## 2023-09-27 RX ORDER — MONTELUKAST SODIUM 10 MG/1
10 TABLET ORAL
Qty: 90 TABLET | Refills: 0 | Status: SHIPPED | OUTPATIENT
Start: 2023-09-27

## 2023-10-24 LAB — COLOGUARD RESULT REPORTABLE: NEGATIVE

## 2023-12-25 DIAGNOSIS — J30.9 ALLERGIC RHINITIS, UNSPECIFIED SEASONALITY, UNSPECIFIED TRIGGER: ICD-10-CM

## 2023-12-25 RX ORDER — MONTELUKAST SODIUM 10 MG/1
10 TABLET ORAL
Qty: 90 TABLET | Refills: 0 | Status: SHIPPED | OUTPATIENT
Start: 2023-12-25

## 2024-01-09 DIAGNOSIS — J30.9 ALLERGIC RHINITIS, UNSPECIFIED SEASONALITY, UNSPECIFIED TRIGGER: ICD-10-CM

## 2024-01-10 RX ORDER — MONTELUKAST SODIUM 10 MG/1
10 TABLET ORAL
Qty: 90 TABLET | Refills: 0 | Status: SHIPPED | OUTPATIENT
Start: 2024-01-10

## 2024-03-20 DIAGNOSIS — J30.9 ALLERGIC RHINITIS, UNSPECIFIED SEASONALITY, UNSPECIFIED TRIGGER: ICD-10-CM

## 2024-03-20 RX ORDER — MONTELUKAST SODIUM 10 MG/1
10 TABLET ORAL
Qty: 90 TABLET | Refills: 0 | Status: SHIPPED | OUTPATIENT
Start: 2024-03-20

## 2024-04-16 DIAGNOSIS — J30.9 ALLERGIC RHINITIS, UNSPECIFIED SEASONALITY, UNSPECIFIED TRIGGER: ICD-10-CM

## 2024-04-16 RX ORDER — MONTELUKAST SODIUM 10 MG/1
10 TABLET ORAL
Qty: 90 TABLET | Refills: 1 | Status: SHIPPED | OUTPATIENT
Start: 2024-04-16

## 2024-07-29 DIAGNOSIS — J30.9 ALLERGIC RHINITIS, UNSPECIFIED SEASONALITY, UNSPECIFIED TRIGGER: ICD-10-CM

## 2024-07-29 RX ORDER — MONTELUKAST SODIUM 10 MG/1
10 TABLET ORAL
Qty: 90 TABLET | Refills: 1 | Status: SHIPPED | OUTPATIENT
Start: 2024-07-29

## 2024-10-24 DIAGNOSIS — J30.9 ALLERGIC RHINITIS, UNSPECIFIED SEASONALITY, UNSPECIFIED TRIGGER: ICD-10-CM

## 2024-10-24 RX ORDER — MONTELUKAST SODIUM 10 MG/1
10 TABLET ORAL
Qty: 90 TABLET | Refills: 1 | Status: SHIPPED | OUTPATIENT
Start: 2024-10-24